# Patient Record
Sex: FEMALE | Race: ASIAN | NOT HISPANIC OR LATINO | URBAN - METROPOLITAN AREA
[De-identification: names, ages, dates, MRNs, and addresses within clinical notes are randomized per-mention and may not be internally consistent; named-entity substitution may affect disease eponyms.]

---

## 2017-01-01 ENCOUNTER — EMERGENCY (EMERGENCY)
Facility: HOSPITAL | Age: 82
LOS: 1 days | End: 2017-01-01
Attending: EMERGENCY MEDICINE | Admitting: EMERGENCY MEDICINE
Payer: MEDICARE

## 2017-01-01 ENCOUNTER — INPATIENT (INPATIENT)
Facility: HOSPITAL | Age: 82
LOS: 2 days | Discharge: NURSING, REHAB OR LONG-TERM CARE | DRG: 641 | End: 2017-09-22
Attending: STUDENT IN AN ORGANIZED HEALTH CARE EDUCATION/TRAINING PROGRAM | Admitting: STUDENT IN AN ORGANIZED HEALTH CARE EDUCATION/TRAINING PROGRAM
Payer: MEDICARE

## 2017-01-01 VITALS
OXYGEN SATURATION: 98 % | SYSTOLIC BLOOD PRESSURE: 96 MMHG | DIASTOLIC BLOOD PRESSURE: 54 MMHG | HEART RATE: 72 BPM | RESPIRATION RATE: 16 BRPM | TEMPERATURE: 98 F

## 2017-01-01 VITALS
DIASTOLIC BLOOD PRESSURE: 50 MMHG | HEART RATE: 59 BPM | RESPIRATION RATE: 16 BRPM | SYSTOLIC BLOOD PRESSURE: 121 MMHG | TEMPERATURE: 98 F | OXYGEN SATURATION: 97 %

## 2017-01-01 VITALS — TEMPERATURE: 32 F

## 2017-01-01 DIAGNOSIS — E11.9 TYPE 2 DIABETES MELLITUS WITHOUT COMPLICATIONS: ICD-10-CM

## 2017-01-01 DIAGNOSIS — F03.90 UNSPECIFIED DEMENTIA, UNSPECIFIED SEVERITY, WITHOUT BEHAVIORAL DISTURBANCE, PSYCHOTIC DISTURBANCE, MOOD DISTURBANCE, AND ANXIETY: ICD-10-CM

## 2017-01-01 DIAGNOSIS — R63.8 OTHER SYMPTOMS AND SIGNS CONCERNING FOOD AND FLUID INTAKE: ICD-10-CM

## 2017-01-01 DIAGNOSIS — Z91.011 ALLERGY TO MILK PRODUCTS: ICD-10-CM

## 2017-01-01 DIAGNOSIS — J90 PLEURAL EFFUSION, NOT ELSEWHERE CLASSIFIED: ICD-10-CM

## 2017-01-01 DIAGNOSIS — E86.0 DEHYDRATION: ICD-10-CM

## 2017-01-01 DIAGNOSIS — R29.6 REPEATED FALLS: ICD-10-CM
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
## 2017-01-01 DIAGNOSIS — I10 ESSENTIAL (PRIMARY) HYPERTENSION: ICD-10-CM

## 2017-01-01 DIAGNOSIS — Z91.013 ALLERGY TO SEAFOOD: ICD-10-CM

## 2017-01-01 DIAGNOSIS — Z29.9 ENCOUNTER FOR PROPHYLACTIC MEASURES, UNSPECIFIED: ICD-10-CM

## 2017-01-01 DIAGNOSIS — Z88.0 ALLERGY STATUS TO PENICILLIN: ICD-10-CM

## 2017-01-01 DIAGNOSIS — Z79.84 LONG TERM (CURRENT) USE OF ORAL HYPOGLYCEMIC DRUGS: ICD-10-CM

## 2017-01-01 DIAGNOSIS — R07.9 CHEST PAIN, UNSPECIFIED: ICD-10-CM

## 2017-01-01 DIAGNOSIS — Z90.5 ACQUIRED ABSENCE OF KIDNEY: Chronic | ICD-10-CM

## 2017-01-01 DIAGNOSIS — I48.2 CHRONIC ATRIAL FIBRILLATION: ICD-10-CM

## 2017-01-01 DIAGNOSIS — E87.1 HYPO-OSMOLALITY AND HYPONATREMIA: ICD-10-CM

## 2017-01-01 DIAGNOSIS — M25.511 PAIN IN RIGHT SHOULDER: ICD-10-CM

## 2017-01-01 DIAGNOSIS — Z79.899 OTHER LONG TERM (CURRENT) DRUG THERAPY: ICD-10-CM

## 2017-01-01 DIAGNOSIS — R15.9 FULL INCONTINENCE OF FECES: ICD-10-CM

## 2017-01-01 DIAGNOSIS — Z90.710 ACQUIRED ABSENCE OF BOTH CERVIX AND UTERUS: Chronic | ICD-10-CM
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
## 2017-01-01 DIAGNOSIS — Z79.01 LONG TERM (CURRENT) USE OF ANTICOAGULANTS: ICD-10-CM

## 2017-01-01 DIAGNOSIS — R07.89 OTHER CHEST PAIN: ICD-10-CM

## 2017-01-01 DIAGNOSIS — T79.6XXA TRAUMATIC ISCHEMIA OF MUSCLE, INITIAL ENCOUNTER: ICD-10-CM

## 2017-01-01 DIAGNOSIS — I46.9 CARDIAC ARREST, CAUSE UNSPECIFIED: ICD-10-CM

## 2017-01-01 LAB
ANION GAP SERPL CALC-SCNC: 10 MMOL/L — SIGNIFICANT CHANGE UP (ref 9–16)
ANION GAP SERPL CALC-SCNC: 11 MMOL/L — SIGNIFICANT CHANGE UP (ref 5–17)
ANION GAP SERPL CALC-SCNC: 12 MMOL/L — SIGNIFICANT CHANGE UP (ref 9–16)
ANION GAP SERPL CALC-SCNC: 13 MMOL/L — SIGNIFICANT CHANGE UP (ref 5–17)
ANION GAP SERPL CALC-SCNC: 13 MMOL/L — SIGNIFICANT CHANGE UP (ref 5–17)
APPEARANCE UR: CLEAR — SIGNIFICANT CHANGE UP
APTT BLD: 51.9 SEC — HIGH (ref 27.5–36.5)
BASOPHILS NFR BLD AUTO: 0.3 % — SIGNIFICANT CHANGE UP (ref 0–2)
BILIRUB UR-MCNC: NEGATIVE — SIGNIFICANT CHANGE UP
BUN SERPL-MCNC: 10 MG/DL — SIGNIFICANT CHANGE UP (ref 7–23)
BUN SERPL-MCNC: 10 MG/DL — SIGNIFICANT CHANGE UP (ref 7–23)
BUN SERPL-MCNC: 12 MG/DL — SIGNIFICANT CHANGE UP (ref 7–23)
BUN SERPL-MCNC: 14 MG/DL — SIGNIFICANT CHANGE UP (ref 7–23)
BUN SERPL-MCNC: 14 MG/DL — SIGNIFICANT CHANGE UP (ref 7–23)
CALCIUM SERPL-MCNC: 8.2 MG/DL — LOW (ref 8.5–10.5)
CALCIUM SERPL-MCNC: 8.4 MG/DL — SIGNIFICANT CHANGE UP (ref 8.4–10.5)
CALCIUM SERPL-MCNC: 8.7 MG/DL — SIGNIFICANT CHANGE UP (ref 8.5–10.5)
CALCIUM SERPL-MCNC: 8.9 MG/DL — SIGNIFICANT CHANGE UP (ref 8.4–10.5)
CALCIUM SERPL-MCNC: 9.1 MG/DL — SIGNIFICANT CHANGE UP (ref 8.4–10.5)
CHLORIDE SERPL-SCNC: 89 MMOL/L — LOW (ref 96–108)
CHLORIDE SERPL-SCNC: 89 MMOL/L — LOW (ref 96–108)
CHLORIDE SERPL-SCNC: 91 MMOL/L — LOW (ref 96–108)
CHLORIDE SERPL-SCNC: 91 MMOL/L — LOW (ref 96–108)
CHLORIDE SERPL-SCNC: 96 MMOL/L — SIGNIFICANT CHANGE UP (ref 96–108)
CK SERPL-CCNC: 541 U/L — HIGH (ref 26–192)
CO2 SERPL-SCNC: 20 MMOL/L — LOW (ref 22–31)
CO2 SERPL-SCNC: 23 MMOL/L — SIGNIFICANT CHANGE UP (ref 22–31)
CO2 SERPL-SCNC: 24 MMOL/L — SIGNIFICANT CHANGE UP (ref 22–31)
COLOR SPEC: YELLOW — SIGNIFICANT CHANGE UP
CREAT SERPL-MCNC: 0.52 MG/DL — SIGNIFICANT CHANGE UP (ref 0.5–1.3)
CREAT SERPL-MCNC: 0.54 MG/DL — SIGNIFICANT CHANGE UP (ref 0.5–1.3)
CREAT SERPL-MCNC: 0.59 MG/DL — SIGNIFICANT CHANGE UP (ref 0.5–1.3)
CREAT SERPL-MCNC: 0.76 MG/DL — SIGNIFICANT CHANGE UP (ref 0.5–1.3)
CREAT SERPL-MCNC: 0.76 MG/DL — SIGNIFICANT CHANGE UP (ref 0.5–1.3)
CULTURE RESULTS: SIGNIFICANT CHANGE UP
DIFF PNL FLD: (no result)
EOSINOPHIL NFR BLD AUTO: 0.2 % — SIGNIFICANT CHANGE UP (ref 0–6)
FOLATE SERPL-MCNC: >20 NG/ML — SIGNIFICANT CHANGE UP (ref 4.8–24.2)
GLUCOSE SERPL-MCNC: 120 MG/DL — HIGH (ref 70–99)
GLUCOSE SERPL-MCNC: 149 MG/DL — HIGH (ref 70–99)
GLUCOSE SERPL-MCNC: 149 MG/DL — HIGH (ref 70–99)
GLUCOSE SERPL-MCNC: 173 MG/DL — HIGH (ref 70–99)
GLUCOSE SERPL-MCNC: 68 MG/DL — LOW (ref 70–99)
GLUCOSE UR QL: 100
HBA1C BLD-MCNC: 6.8 % — HIGH (ref 4–5.6)
HCT VFR BLD CALC: 34.7 % — SIGNIFICANT CHANGE UP (ref 34.5–45)
HCT VFR BLD CALC: 37.1 % — SIGNIFICANT CHANGE UP (ref 34.5–45)
HCT VFR BLD CALC: 38.3 % — SIGNIFICANT CHANGE UP (ref 34.5–45)
HGB BLD-MCNC: 12.3 G/DL — SIGNIFICANT CHANGE UP (ref 11.5–15.5)
HGB BLD-MCNC: 13.3 G/DL — SIGNIFICANT CHANGE UP (ref 11.5–15.5)
HGB BLD-MCNC: 13.3 G/DL — SIGNIFICANT CHANGE UP (ref 11.5–15.5)
IMM GRANULOCYTES NFR BLD AUTO: 0.8 % — SIGNIFICANT CHANGE UP (ref 0–1.5)
INR BLD: 1.21 — HIGH (ref 0.88–1.16)
KETONES UR-MCNC: 15 MG/DL
LEUKOCYTE ESTERASE UR-ACNC: NEGATIVE — SIGNIFICANT CHANGE UP
LYMPHOCYTES # BLD AUTO: 4 % — LOW (ref 13–44)
MAGNESIUM SERPL-MCNC: 1.6 MG/DL — SIGNIFICANT CHANGE UP (ref 1.6–2.6)
MAGNESIUM SERPL-MCNC: 1.7 MG/DL — SIGNIFICANT CHANGE UP (ref 1.6–2.6)
MCHC RBC-ENTMCNC: 31.3 PG — SIGNIFICANT CHANGE UP (ref 27–34)
MCHC RBC-ENTMCNC: 31.4 PG — SIGNIFICANT CHANGE UP (ref 27–34)
MCHC RBC-ENTMCNC: 31.5 PG — SIGNIFICANT CHANGE UP (ref 27–34)
MCHC RBC-ENTMCNC: 34.7 G/DL — SIGNIFICANT CHANGE UP (ref 32–36)
MCHC RBC-ENTMCNC: 35.4 G/DL — SIGNIFICANT CHANGE UP (ref 32–36)
MCHC RBC-ENTMCNC: 35.8 G/DL — SIGNIFICANT CHANGE UP (ref 32–36)
MCV RBC AUTO: 87.9 FL — SIGNIFICANT CHANGE UP (ref 80–100)
MCV RBC AUTO: 88.5 FL — SIGNIFICANT CHANGE UP (ref 80–100)
MCV RBC AUTO: 90.1 FL — SIGNIFICANT CHANGE UP (ref 80–100)
MONOCYTES NFR BLD AUTO: 4.9 % — SIGNIFICANT CHANGE UP (ref 2–14)
NEUTROPHILS NFR BLD AUTO: 89.8 % — HIGH (ref 43–77)
NITRITE UR-MCNC: NEGATIVE — SIGNIFICANT CHANGE UP
OSMOLALITY SERPL: 256 MOSM/KG — LOW (ref 280–301)
OSMOLALITY UR: 299 MOSMOL/KG — SIGNIFICANT CHANGE UP (ref 100–650)
PH UR: 6.5 — SIGNIFICANT CHANGE UP (ref 5–8)
PHOSPHATE SERPL-MCNC: 2.6 MG/DL — SIGNIFICANT CHANGE UP (ref 2.5–4.5)
PLATELET # BLD AUTO: 172 K/UL — SIGNIFICANT CHANGE UP (ref 150–400)
PLATELET # BLD AUTO: 181 K/UL — SIGNIFICANT CHANGE UP (ref 150–400)
PLATELET # BLD AUTO: 197 K/UL — SIGNIFICANT CHANGE UP (ref 150–400)
POTASSIUM SERPL-MCNC: 3.4 MMOL/L — LOW (ref 3.5–5.3)
POTASSIUM SERPL-MCNC: 3.8 MMOL/L — SIGNIFICANT CHANGE UP (ref 3.5–5.3)
POTASSIUM SERPL-MCNC: 4.1 MMOL/L — SIGNIFICANT CHANGE UP (ref 3.5–5.3)
POTASSIUM SERPL-SCNC: 3.4 MMOL/L — LOW (ref 3.5–5.3)
POTASSIUM SERPL-SCNC: 3.8 MMOL/L — SIGNIFICANT CHANGE UP (ref 3.5–5.3)
POTASSIUM SERPL-SCNC: 4.1 MMOL/L — SIGNIFICANT CHANGE UP (ref 3.5–5.3)
PROT UR-MCNC: 100 MG/DL
PROTHROM AB SERPL-ACNC: 13.4 SEC — HIGH (ref 9.8–12.7)
RBC # BLD: 3.92 M/UL — SIGNIFICANT CHANGE UP (ref 3.8–5.2)
RBC # BLD: 4.22 M/UL — SIGNIFICANT CHANGE UP (ref 3.8–5.2)
RBC # BLD: 4.25 M/UL — SIGNIFICANT CHANGE UP (ref 3.8–5.2)
RBC # FLD: 13.2 % — SIGNIFICANT CHANGE UP (ref 10.3–16.9)
RBC # FLD: 14 % — SIGNIFICANT CHANGE UP (ref 10.3–16.9)
RBC # FLD: 14.3 % — SIGNIFICANT CHANGE UP (ref 10.3–16.9)
SODIUM SERPL-SCNC: 121 MMOL/L — LOW (ref 132–145)
SODIUM SERPL-SCNC: 124 MMOL/L — LOW (ref 132–145)
SODIUM SERPL-SCNC: 125 MMOL/L — LOW (ref 135–145)
SODIUM SERPL-SCNC: 126 MMOL/L — LOW (ref 135–145)
SODIUM SERPL-SCNC: 132 MMOL/L — LOW (ref 135–145)
SODIUM UR-SCNC: 45 MMOL/L — SIGNIFICANT CHANGE UP
SP GR SPEC: 1.02 — SIGNIFICANT CHANGE UP (ref 1–1.03)
SPECIMEN SOURCE: SIGNIFICANT CHANGE UP
TROPONIN I SERPL-MCNC: 0.04 NG/ML — SIGNIFICANT CHANGE UP (ref 0.02–0.06)
TSH SERPL-MCNC: 0.94 UIU/ML — SIGNIFICANT CHANGE UP (ref 0.35–4.94)
UROBILINOGEN FLD QL: 0.2 E.U./DL — SIGNIFICANT CHANGE UP
VIT B12 SERPL-MCNC: 1670 PG/ML — HIGH (ref 243–894)
WBC # BLD: 10.1 K/UL — SIGNIFICANT CHANGE UP (ref 3.8–10.5)
WBC # BLD: 7.3 K/UL — SIGNIFICANT CHANGE UP (ref 3.8–10.5)
WBC # BLD: 7.4 K/UL — SIGNIFICANT CHANGE UP (ref 3.8–10.5)
WBC # FLD AUTO: 10.1 K/UL — SIGNIFICANT CHANGE UP (ref 3.8–10.5)
WBC # FLD AUTO: 7.3 K/UL — SIGNIFICANT CHANGE UP (ref 3.8–10.5)
WBC # FLD AUTO: 7.4 K/UL — SIGNIFICANT CHANGE UP (ref 3.8–10.5)

## 2017-01-01 PROCEDURE — 99285 EMERGENCY DEPT VISIT HI MDM: CPT | Mod: 25

## 2017-01-01 PROCEDURE — 96374 THER/PROPH/DIAG INJ IV PUSH: CPT | Mod: XU

## 2017-01-01 PROCEDURE — 71100 X-RAY EXAM RIBS UNI 2 VIEWS: CPT

## 2017-01-01 PROCEDURE — 71045 X-RAY EXAM CHEST 1 VIEW: CPT

## 2017-01-01 PROCEDURE — 99238 HOSP IP/OBS DSCHRG MGMT 30/<: CPT

## 2017-01-01 PROCEDURE — 93010 ELECTROCARDIOGRAM REPORT: CPT

## 2017-01-01 PROCEDURE — 84100 ASSAY OF PHOSPHORUS: CPT

## 2017-01-01 PROCEDURE — 83735 ASSAY OF MAGNESIUM: CPT

## 2017-01-01 PROCEDURE — 71010: CPT | Mod: 26

## 2017-01-01 PROCEDURE — 97161 PT EVAL LOW COMPLEX 20 MIN: CPT

## 2017-01-01 PROCEDURE — 92950 HEART/LUNG RESUSCITATION CPR: CPT

## 2017-01-01 PROCEDURE — 70450 CT HEAD/BRAIN W/O DYE: CPT

## 2017-01-01 PROCEDURE — 72170 X-RAY EXAM OF PELVIS: CPT | Mod: 26

## 2017-01-01 PROCEDURE — 99223 1ST HOSP IP/OBS HIGH 75: CPT | Mod: GC

## 2017-01-01 PROCEDURE — 83935 ASSAY OF URINE OSMOLALITY: CPT

## 2017-01-01 PROCEDURE — 99291 CRITICAL CARE FIRST HOUR: CPT | Mod: 25

## 2017-01-01 PROCEDURE — 82746 ASSAY OF FOLIC ACID SERUM: CPT

## 2017-01-01 PROCEDURE — 36415 COLL VENOUS BLD VENIPUNCTURE: CPT

## 2017-01-01 PROCEDURE — 72170 X-RAY EXAM OF PELVIS: CPT

## 2017-01-01 PROCEDURE — 84443 ASSAY THYROID STIM HORMONE: CPT

## 2017-01-01 PROCEDURE — 95819 EEG AWAKE AND ASLEEP: CPT | Mod: 26

## 2017-01-01 PROCEDURE — 85610 PROTHROMBIN TIME: CPT

## 2017-01-01 PROCEDURE — 82550 ASSAY OF CK (CPK): CPT

## 2017-01-01 PROCEDURE — 85730 THROMBOPLASTIN TIME PARTIAL: CPT

## 2017-01-01 PROCEDURE — 82607 VITAMIN B-12: CPT

## 2017-01-01 PROCEDURE — 99233 SBSQ HOSP IP/OBS HIGH 50: CPT

## 2017-01-01 PROCEDURE — 96375 TX/PRO/DX INJ NEW DRUG ADDON: CPT | Mod: XU

## 2017-01-01 PROCEDURE — 72125 CT NECK SPINE W/O DYE: CPT

## 2017-01-01 PROCEDURE — 85025 COMPLETE CBC W/AUTO DIFF WBC: CPT

## 2017-01-01 PROCEDURE — 83930 ASSAY OF BLOOD OSMOLALITY: CPT

## 2017-01-01 PROCEDURE — 87086 URINE CULTURE/COLONY COUNT: CPT

## 2017-01-01 PROCEDURE — 71101 X-RAY EXAM UNILAT RIBS/CHEST: CPT | Mod: 26

## 2017-01-01 PROCEDURE — 73030 X-RAY EXAM OF SHOULDER: CPT

## 2017-01-01 PROCEDURE — 81001 URINALYSIS AUTO W/SCOPE: CPT

## 2017-01-01 PROCEDURE — 70450 CT HEAD/BRAIN W/O DYE: CPT | Mod: 26

## 2017-01-01 PROCEDURE — 93005 ELECTROCARDIOGRAM TRACING: CPT

## 2017-01-01 PROCEDURE — 95819 EEG AWAKE AND ASLEEP: CPT

## 2017-01-01 PROCEDURE — 73030 X-RAY EXAM OF SHOULDER: CPT | Mod: 26,RT

## 2017-01-01 PROCEDURE — 80048 BASIC METABOLIC PNL TOTAL CA: CPT

## 2017-01-01 PROCEDURE — 84484 ASSAY OF TROPONIN QUANT: CPT

## 2017-01-01 PROCEDURE — 85027 COMPLETE CBC AUTOMATED: CPT

## 2017-01-01 PROCEDURE — 84300 ASSAY OF URINE SODIUM: CPT

## 2017-01-01 PROCEDURE — 72125 CT NECK SPINE W/O DYE: CPT | Mod: 26

## 2017-01-01 PROCEDURE — 97116 GAIT TRAINING THERAPY: CPT

## 2017-01-01 PROCEDURE — 83036 HEMOGLOBIN GLYCOSYLATED A1C: CPT

## 2017-01-01 RX ORDER — GLUCAGON INJECTION, SOLUTION 0.5 MG/.1ML
1 INJECTION, SOLUTION SUBCUTANEOUS ONCE
Qty: 0 | Refills: 0 | Status: DISCONTINUED | OUTPATIENT
Start: 2017-01-01 | End: 2017-01-01

## 2017-01-01 RX ORDER — EPINEPHRINE 0.3 MG/.3ML
1 INJECTION INTRAMUSCULAR; SUBCUTANEOUS ONCE
Qty: 0 | Refills: 0 | Status: COMPLETED | OUTPATIENT
Start: 2017-01-01 | End: 2017-01-01

## 2017-01-01 RX ORDER — ACETAMINOPHEN 500 MG
650 TABLET ORAL EVERY 6 HOURS
Qty: 0 | Refills: 0 | Status: DISCONTINUED | OUTPATIENT
Start: 2017-01-01 | End: 2017-01-01

## 2017-01-01 RX ORDER — HEPARIN SODIUM 5000 [USP'U]/ML
5000 INJECTION INTRAVENOUS; SUBCUTANEOUS EVERY 8 HOURS
Qty: 0 | Refills: 0 | Status: DISCONTINUED | OUTPATIENT
Start: 2017-01-01 | End: 2017-01-01

## 2017-01-01 RX ORDER — SODIUM CHLORIDE 9 MG/ML
1000 INJECTION INTRAMUSCULAR; INTRAVENOUS; SUBCUTANEOUS
Qty: 0 | Refills: 0 | Status: DISCONTINUED | OUTPATIENT
Start: 2017-01-01 | End: 2017-01-01

## 2017-01-01 RX ORDER — INSULIN LISPRO 100/ML
VIAL (ML) SUBCUTANEOUS
Qty: 0 | Refills: 0 | Status: DISCONTINUED | OUTPATIENT
Start: 2017-01-01 | End: 2017-01-01

## 2017-01-01 RX ORDER — MEMANTINE HYDROCHLORIDE 10 MG/1
20 TABLET ORAL DAILY
Qty: 0 | Refills: 0 | Status: DISCONTINUED | OUTPATIENT
Start: 2017-01-01 | End: 2017-01-01

## 2017-01-01 RX ORDER — SITAGLIPTIN 50 MG/1
1 TABLET, FILM COATED ORAL
Qty: 0 | Refills: 0 | COMMUNITY

## 2017-01-01 RX ORDER — DEXTROSE 50 % IN WATER 50 %
25 SYRINGE (ML) INTRAVENOUS ONCE
Qty: 0 | Refills: 0 | Status: DISCONTINUED | OUTPATIENT
Start: 2017-01-01 | End: 2017-01-01

## 2017-01-01 RX ORDER — SODIUM BICARBONATE 1 MEQ/ML
50 SYRINGE (ML) INTRAVENOUS ONCE
Qty: 0 | Refills: 0 | Status: COMPLETED | OUTPATIENT
Start: 2017-01-01 | End: 2017-01-01

## 2017-01-01 RX ORDER — LISINOPRIL 2.5 MG/1
1 TABLET ORAL
Qty: 0 | Refills: 0 | COMMUNITY

## 2017-01-01 RX ORDER — PROPAFENONE HCL 150 MG
1 TABLET ORAL
Qty: 0 | Refills: 0 | COMMUNITY

## 2017-01-01 RX ORDER — PROPAFENONE HCL 150 MG
225 TABLET ORAL
Qty: 0 | Refills: 0 | Status: DISCONTINUED | OUTPATIENT
Start: 2017-01-01 | End: 2017-01-01

## 2017-01-01 RX ORDER — SODIUM CHLORIDE 9 MG/ML
500 INJECTION INTRAMUSCULAR; INTRAVENOUS; SUBCUTANEOUS ONCE
Qty: 0 | Refills: 0 | Status: COMPLETED | OUTPATIENT
Start: 2017-01-01 | End: 2017-01-01

## 2017-01-01 RX ORDER — CALCIUM CHLORIDE
100 POWDER (GRAM) MISCELLANEOUS ONCE
Qty: 0 | Refills: 0 | Status: COMPLETED | OUTPATIENT
Start: 2017-01-01 | End: 2017-01-01

## 2017-01-01 RX ORDER — MAGNESIUM SULFATE 500 MG/ML
2 VIAL (ML) INJECTION ONCE
Qty: 0 | Refills: 0 | Status: COMPLETED | OUTPATIENT
Start: 2017-01-01 | End: 2017-01-01

## 2017-01-01 RX ORDER — DEXTROSE 50 % IN WATER 50 %
12.5 SYRINGE (ML) INTRAVENOUS ONCE
Qty: 0 | Refills: 0 | Status: DISCONTINUED | OUTPATIENT
Start: 2017-01-01 | End: 2017-01-01

## 2017-01-01 RX ORDER — POTASSIUM CHLORIDE 20 MEQ
40 PACKET (EA) ORAL ONCE
Qty: 0 | Refills: 0 | Status: COMPLETED | OUTPATIENT
Start: 2017-01-01 | End: 2017-01-01

## 2017-01-01 RX ORDER — DEXTROSE 50 % IN WATER 50 %
1 SYRINGE (ML) INTRAVENOUS ONCE
Qty: 0 | Refills: 0 | Status: DISCONTINUED | OUTPATIENT
Start: 2017-01-01 | End: 2017-01-01

## 2017-01-01 RX ORDER — SOLIFENACIN SUCCINATE 10 MG/1
1 TABLET ORAL
Qty: 0 | Refills: 0 | COMMUNITY

## 2017-01-01 RX ORDER — SODIUM CHLORIDE 9 MG/ML
1000 INJECTION, SOLUTION INTRAVENOUS
Qty: 0 | Refills: 0 | Status: DISCONTINUED | OUTPATIENT
Start: 2017-01-01 | End: 2017-01-01

## 2017-01-01 RX ORDER — METFORMIN HYDROCHLORIDE 850 MG/1
1 TABLET ORAL
Qty: 0 | Refills: 0 | COMMUNITY

## 2017-01-01 RX ORDER — MEMANTINE HYDROCHLORIDE 10 MG/1
21 TABLET ORAL
Qty: 0 | Refills: 0 | COMMUNITY

## 2017-01-01 RX ORDER — OXYBUTYNIN CHLORIDE 5 MG
5 TABLET ORAL DAILY
Qty: 0 | Refills: 0 | Status: DISCONTINUED | OUTPATIENT
Start: 2017-01-01 | End: 2017-01-01

## 2017-01-01 RX ORDER — TRAMADOL HYDROCHLORIDE 50 MG/1
25 TABLET ORAL ONCE
Qty: 0 | Refills: 0 | Status: DISCONTINUED | OUTPATIENT
Start: 2017-01-01 | End: 2017-01-01

## 2017-01-01 RX ORDER — PROPAFENONE HCL 150 MG
225 TABLET ORAL ONCE
Qty: 0 | Refills: 0 | Status: COMPLETED | OUTPATIENT
Start: 2017-01-01 | End: 2017-01-01

## 2017-01-01 RX ORDER — LISINOPRIL 2.5 MG/1
5 TABLET ORAL DAILY
Qty: 0 | Refills: 0 | Status: DISCONTINUED | OUTPATIENT
Start: 2017-01-01 | End: 2017-01-01

## 2017-01-01 RX ORDER — SIMVASTATIN 20 MG/1
1 TABLET, FILM COATED ORAL
Qty: 0 | Refills: 0 | COMMUNITY

## 2017-01-01 RX ORDER — DABIGATRAN ETEXILATE MESYLATE 150 MG/1
75 CAPSULE ORAL EVERY 12 HOURS
Qty: 0 | Refills: 0 | Status: DISCONTINUED | OUTPATIENT
Start: 2017-01-01 | End: 2017-01-01

## 2017-01-01 RX ORDER — DABIGATRAN ETEXILATE MESYLATE 150 MG/1
1 CAPSULE ORAL
Qty: 0 | Refills: 0 | COMMUNITY

## 2017-01-01 RX ORDER — SIMVASTATIN 20 MG/1
10 TABLET, FILM COATED ORAL AT BEDTIME
Qty: 0 | Refills: 0 | Status: DISCONTINUED | OUTPATIENT
Start: 2017-01-01 | End: 2017-01-01

## 2017-01-01 RX ADMIN — LISINOPRIL 5 MILLIGRAM(S): 2.5 TABLET ORAL at 06:43

## 2017-01-01 RX ADMIN — SODIUM CHLORIDE 1500 MILLILITER(S): 9 INJECTION INTRAMUSCULAR; INTRAVENOUS; SUBCUTANEOUS at 21:49

## 2017-01-01 RX ADMIN — Medication 50 MILLIEQUIVALENT(S): at 04:24

## 2017-01-01 RX ADMIN — LISINOPRIL 5 MILLIGRAM(S): 2.5 TABLET ORAL at 05:31

## 2017-01-01 RX ADMIN — Medication 5 MILLIGRAM(S): at 12:15

## 2017-01-01 RX ADMIN — Medication 4: at 12:41

## 2017-01-01 RX ADMIN — MEMANTINE HYDROCHLORIDE 20 MILLIGRAM(S): 10 TABLET ORAL at 11:42

## 2017-01-01 RX ADMIN — Medication 650 MILLIGRAM(S): at 21:01

## 2017-01-01 RX ADMIN — DABIGATRAN ETEXILATE MESYLATE 75 MILLIGRAM(S): 150 CAPSULE ORAL at 10:35

## 2017-01-01 RX ADMIN — Medication 5 MILLIGRAM(S): at 10:18

## 2017-01-01 RX ADMIN — Medication 2: at 10:02

## 2017-01-01 RX ADMIN — HEPARIN SODIUM 5000 UNIT(S): 5000 INJECTION INTRAVENOUS; SUBCUTANEOUS at 06:42

## 2017-01-01 RX ADMIN — Medication 650 MILLIGRAM(S): at 16:29

## 2017-01-01 RX ADMIN — MEMANTINE HYDROCHLORIDE 20 MILLIGRAM(S): 10 TABLET ORAL at 10:18

## 2017-01-01 RX ADMIN — Medication 2: at 18:17

## 2017-01-01 RX ADMIN — Medication 650 MILLIGRAM(S): at 18:16

## 2017-01-01 RX ADMIN — HEPARIN SODIUM 5000 UNIT(S): 5000 INJECTION INTRAVENOUS; SUBCUTANEOUS at 05:13

## 2017-01-01 RX ADMIN — Medication 50 MILLIEQUIVALENT(S): at 04:29

## 2017-01-01 RX ADMIN — EPINEPHRINE 1 MILLIGRAM(S): 0.3 INJECTION INTRAMUSCULAR; SUBCUTANEOUS at 04:23

## 2017-01-01 RX ADMIN — Medication 225 MILLIGRAM(S): at 05:14

## 2017-01-01 RX ADMIN — Medication 100 MILLIGRAM(S): at 04:25

## 2017-01-01 RX ADMIN — LISINOPRIL 5 MILLIGRAM(S): 2.5 TABLET ORAL at 05:13

## 2017-01-01 RX ADMIN — DABIGATRAN ETEXILATE MESYLATE 75 MILLIGRAM(S): 150 CAPSULE ORAL at 22:38

## 2017-01-01 RX ADMIN — Medication 225 MILLIGRAM(S): at 07:20

## 2017-01-01 RX ADMIN — DABIGATRAN ETEXILATE MESYLATE 75 MILLIGRAM(S): 150 CAPSULE ORAL at 10:02

## 2017-01-01 RX ADMIN — Medication 100 MILLIGRAM(S): at 04:30

## 2017-01-01 RX ADMIN — Medication 225 MILLIGRAM(S): at 18:17

## 2017-01-01 RX ADMIN — Medication 650 MILLIGRAM(S): at 05:13

## 2017-01-01 RX ADMIN — EPINEPHRINE 1 MILLIGRAM(S): 0.3 INJECTION INTRAMUSCULAR; SUBCUTANEOUS at 04:31

## 2017-01-01 RX ADMIN — SIMVASTATIN 10 MILLIGRAM(S): 20 TABLET, FILM COATED ORAL at 22:32

## 2017-01-01 RX ADMIN — Medication 4: at 12:43

## 2017-01-01 RX ADMIN — Medication 40 MILLIEQUIVALENT(S): at 06:43

## 2017-01-01 RX ADMIN — HEPARIN SODIUM 5000 UNIT(S): 5000 INJECTION INTRAVENOUS; SUBCUTANEOUS at 21:37

## 2017-01-01 RX ADMIN — Medication 5 MILLIGRAM(S): at 11:42

## 2017-01-01 RX ADMIN — MEMANTINE HYDROCHLORIDE 20 MILLIGRAM(S): 10 TABLET ORAL at 12:15

## 2017-01-01 RX ADMIN — SODIUM CHLORIDE 1500 MILLILITER(S): 9 INJECTION INTRAMUSCULAR; INTRAVENOUS; SUBCUTANEOUS at 10:11

## 2017-01-01 RX ADMIN — Medication 225 MILLIGRAM(S): at 19:44

## 2017-01-01 RX ADMIN — SODIUM CHLORIDE 125 MILLILITER(S): 9 INJECTION INTRAMUSCULAR; INTRAVENOUS; SUBCUTANEOUS at 23:36

## 2017-01-01 RX ADMIN — EPINEPHRINE 1 MILLIGRAM(S): 0.3 INJECTION INTRAMUSCULAR; SUBCUTANEOUS at 04:27

## 2017-01-01 RX ADMIN — Medication 650 MILLIGRAM(S): at 17:15

## 2017-01-01 RX ADMIN — Medication 50 GRAM(S): at 10:16

## 2017-01-01 RX ADMIN — Medication 225 MILLIGRAM(S): at 23:36

## 2017-01-01 RX ADMIN — TRAMADOL HYDROCHLORIDE 25 MILLIGRAM(S): 50 TABLET ORAL at 22:15

## 2017-01-01 RX ADMIN — TRAMADOL HYDROCHLORIDE 25 MILLIGRAM(S): 50 TABLET ORAL at 21:01

## 2017-01-01 RX ADMIN — SIMVASTATIN 10 MILLIGRAM(S): 20 TABLET, FILM COATED ORAL at 21:38

## 2017-01-01 RX ADMIN — Medication 4: at 18:17

## 2017-01-01 RX ADMIN — Medication 50 GRAM(S): at 06:43

## 2017-01-01 RX ADMIN — Medication 225 MILLIGRAM(S): at 05:30

## 2017-09-19 NOTE — ED PROVIDER NOTE - MUSCULOSKELETAL, MLM
TTP along anterior R sided ribs. No crepitus. No ecchymosis. No deformities. Ecchymosis to R anterior shoulder. Old rotator cuff injury noted by daughter during exam. Upper extremities able to range active and passive. Vascularly intact with good equal distal pulses. Lower extremities no deformity, no shortening. Full ROM active and passive. No tenderness appreciated around pelvis. Pelvis stable. No pinpoint back tenderness.

## 2017-09-19 NOTE — ED PROVIDER NOTE - MEDICAL DECISION MAKING DETAILS
CT brain and C-spine, CXR, X-ray pelvis and x-ray rib series. Tramadol and Tylenol for pain. Full labs including CPK to r/o rhabdo. Troponin, r/o ACS. EKG done. R/o any other cause of fall.

## 2017-09-19 NOTE — ED ADULT TRIAGE NOTE - CHIEF COMPLAINT QUOTE
Pt brought in by daughter for unwitnessed fall. Daughter last spoke with pt last night, was found on the floor of her apartment this am. Pt has hx of dementia and is unable to elaborate on fall

## 2017-09-19 NOTE — ED ADULT NURSE NOTE - OBJECTIVE STATEMENT
pt biba with daughter s/p unwitnessed fall possibly today? daughter last spoke with pt last night. pt found on fall this morning. pt has dementia and is unable to explain what happened and does not have recollection of fall. hx htn dm afib. pt is on pradaxa. md aware, waiting for ct scan. pt placed on cardiac montor.

## 2017-09-19 NOTE — ED PROVIDER NOTE - OBJECTIVE STATEMENT
89 Chinese speaking F, hx afib, on prodaxa, advanced dementia, HTN, IDDM, lives alone, has HHA from 2-6 every day. brought in [place] being found on the floor of a bathroom which was wet, lying on her back. daughter is unsure how long she as o nthe floor. last spok at 9pm last night and was getting up to have ln meal. pt daughter notes she ambulates w walker or cane. has imbalance issues and hx of falls. notes that mother is now at baseline a&o x1. follows simple commands. took mother to her pmd at [place] [facility] and referreed to ER for imaging and evaluation. Pt's son noted he called mo mat 11 and she ddi not pic up fone, daughter went to apt at 230 foudn in urine and feces, hx full incontine wears diaper, found slippers on wet fl. awake ust not able to get up, daughter helped into taxi curr c/o R shoulder pain R rib pain and R thigh pain. Poor historian 2/2 dementia. 89 Chinese speaking F, hx A-fib, on Prodaxa, h/o advanced dementia, HTN, IDDM. Lives alone, has HHA from 2-6PM every day. Pt brought in by daughter after being found on the wet floor of a bathroom lying on her back. Daughter is unsure how long pt was on the floor. Last spoke to pt at 9pm last night and she was getting up at that time to have a late meal. Pt's daughter notes she ambulates with a walker or cane as she has imbalance issues and h/o falls. Notes that mother is now at baseline which is A&O x1. Pt follows simple commands. Took mother to her PMD at [place] [facility] and was referred to ER for imaging and evaluation. Pt's son noted he called mother at 11 today and she did not  the phone. Pt's daughter went to her apt at 2:30 and found her in her own urine and feces. Pt has h/o full incontinence and wears a diaper. Pt was awake but unable to get up. Daughter helped her into a taxi to see her PMD. Pt currently c/o R shoulder pain, R rib pain, and R thigh pain. Poor historian 2/2 dementia.

## 2017-09-19 NOTE — ED PROVIDER NOTE - PROGRESS NOTE DETAILS
repeat labs (prior were hemolyzed), with sodium 121. called [facility], spoke to NP Lev. she pulled up old records from the office, and notes recent sodium end of august was normal at 133, and prior to that 132. explained results to daughter. will hydrate and repeat, if still low recommend admission. NP from [facility] notes they admit to BI

## 2017-09-20 NOTE — H&P ADULT - NSHPPHYSICALEXAM_GEN_ALL_CORE
.  VITAL SIGNS:  T(C): 36.2 (09-20-17 @ 01:00), Max: 36.8 (09-19-17 @ 19:04)  T(F): 97.2 (09-20-17 @ 01:00), Max: 98.2 (09-19-17 @ 19:04)  HR: 66 (09-20-17 @ 01:00) (66 - 77)  BP: 132/68 (09-20-17 @ 01:00) (96/54 - 167/77)  BP(mean): --  RR: 16 (09-20-17 @ 01:00) (16 - 17)  SpO2: 97% (09-20-17 @ 01:00) (97% - 98%)  Wt(kg): --    PHYSICAL EXAM:    Constitutional: WDWN resting comfortably in bed; NAD  Head: NC/AT  Eyes: PERRL, EOMI, clear conjunctiva  ENT: no nasal discharge; uvula midline, no oropharyngeal erythema or exudates; MMM  Neck: supple; no JVD or thyromegaly  Respiratory: CTA B/L; no W/R/R, no retractions  Cardiac: +S1/S2; RRR; no M/R/G; PMI non-displaced  Gastrointestinal: soft, NT/ND; no rebound or guarding; +BSx4  Back: spine midline, no bony tenderness or step-offs; no CVAT B/L  Extremities: WWP, no clubbing or cyanosis; no peripheral edema  Musculoskeletal: TTP over right shoulder and right upper chest. NROM x4; no joint swelling, tenderness or erythema  Vascular: 2+ radial, DP pulses B/L  Dermatologic: 10cm diameter ecchymotic lesion overlying right shoulder  Lymphatic: no submandibular or cervical LAD  Neurologic: AAOx1; CNII-XII grossly intact; no focal deficits

## 2017-09-20 NOTE — PROGRESS NOTE ADULT - PROBLEM SELECTOR PLAN 4
cont. home CV rx, A/C w/ DOAC; will need to d/w outpatient cardiologist about concerns re: recurrent falls while on A/C

## 2017-09-20 NOTE — PROGRESS NOTE ADULT - SUBJECTIVE AND OBJECTIVE BOX
INTERVAL EVENTS:    Pt notes R anterior shoulder pain, tender to palpation. Description is nonspecific. Also notes pain on right anterior chest (ribs 4-5) and back, tender to palpation and worsened by inspiration. Weakness in RLE. Denies dizziness, headache, changes in vision, palpitations, chest pain, SOB, or abd pain.    Vital Signs Last 12 Hrs  T(F): 97.9 (17 @ 09:11), Max: 97.9 (17 @ 09:11)  HR: 67 (17 @ 09:11) (66 - 69)  BP: 143/74 (17 @ 09:11) (122/64 - 167/77)  BP(mean): --  RR: 16 (17 @ 09:11) (16 - 17)  SpO2: 100% (17 @ 09:11) (97% - 100%)  I&O's Summary    19 Sep 2017 07:01  -  20 Sep 2017 07:00  --------------------------------------------------------  IN: 50 mL / OUT: 0 mL / NET: 50 mL        PHYSICAL EXAM:    Constitutional: NAD, AAOx1, comfortable in bed.   Respiratory: Normal rate, rhythm, depth, effort. CTAB. No w/r/r.   Cardiovascular: RRR, normal S1 and S2, no m/r/g.   Gastrointestinal: +BS, soft NTND.   Extremities: wwp, no edema.   Vascular: Pulses equal and strong throughout.   Neurological: Cranial nerves grossly intact. Upper extremity strength 5/5 b/l, sensation intact. Lower extremity strength 5/5 on left, 3/5 on right, sensation on RLE < LLE.  Skin: No gross skin abnormalities.         LABS:                        12.3   7.3   )-----------( 172      ( 20 Sep 2017 02:38 )             34.7         125<L>  |  89<L>  |  12  ----------------------------<  68<L>  3.4<L>   |  23  |  0.54    Ca    8.4      20 Sep 2017 02:38  Mg     1.6     09-20      PT/INR - ( 19 Sep 2017 21:25 )   PT: 13.4 sec;   INR: 1.21          PTT - ( 19 Sep 2017 21:25 )  PTT:51.9 sec  Urinalysis Basic - ( 19 Sep 2017 21:42 )    Color: Yellow / Appearance: Clear / S.020 / pH: x  Gluc: x / Ketone: 15 mg/dL  / Bili: NEGATIVE / Urobili: 0.2 E.U./dL   Blood: x / Protein: 100 mg/dL / Nitrite: NEGATIVE   Leuk Esterase: NEGATIVE / RBC: < 5 /HPF / WBC < 5 /HPF   Sq Epi: x / Non Sq Epi: Few /HPF / Bacteria: Present /HPF        RADIOLOGY & ADDITIONAL TESTS:    MEDICATIONS  (STANDING):  heparin  Injectable 5000 Unit(s) SubCutaneous every 8 hours  lisinopril 5 milliGRAM(s) Oral daily  propafenone 225 milliGRAM(s) Oral two times a day  simvastatin 10 milliGRAM(s) Oral at bedtime  memantine 20 milliGRAM(s) Oral daily  oxybutynin 5 milliGRAM(s) Oral daily  insulin lispro (HumaLOG) corrective regimen sliding scale   SubCutaneous Before meals and at bedtime  dextrose 5%. 1000 milliLiter(s) (50 mL/Hr) IV Continuous <Continuous>  dextrose 50% Injectable 12.5 Gram(s) IV Push once  dextrose 50% Injectable 25 Gram(s) IV Push once  dextrose 50% Injectable 25 Gram(s) IV Push once    MEDICATIONS  (PRN):  acetaminophen   Tablet 650 milliGRAM(s) Oral every 6 hours PRN For Temp greater than 38.5 C (101.3 F)  dextrose Gel 1 Dose(s) Oral once PRN Blood Glucose LESS THAN 70 milliGRAM(s)/deciliter  glucagon  Injectable 1 milliGRAM(s) IntraMuscular once PRN Glucose LESS THAN 70 milligrams/deciliter INTERVAL EVENTS:    Patient seen and examined at bedside. Pt notes R anterior shoulder pain, tender to palpation. Description is nonspecific. Also notes pain on right anterior chest (ribs 4-5) and back, tender to palpation and worsened by inspiration. Weakness in RLE. Denies dizziness, headache, changes in vision, palpitations, chest pain, SOB, or abd pain.      ID: [id number]    Vital Signs Last 12 Hrs  T(F): 97.9 (17 @ 09:11), Max: 97.9 (17 @ 09:11)  HR: 67 (17 @ 09:11) (66 - 69)  BP: 143/74 (17 @ 09:11) (122/64 - 167/77)  BP(mean): --  RR: 16 (17 @ 09:11) (16 - 17)  SpO2: 100% (17 @ 09:11) (97% - 100%)  I&O's Summary    19 Sep 2017 07:01  -  20 Sep 2017 07:00  --------------------------------------------------------  IN: 50 mL / OUT: 0 mL / NET: 50 mL        PHYSICAL EXAM:    Constitutional: NAD, AAOx1, comfortable in bed.   Respiratory: Normal rate, rhythm, depth, effort. CTAB. No w/r/r.   Cardiovascular: RRR, normal S1 and S2, no m/r/g.   Gastrointestinal: +BS, soft NTND.   Extremities: wwp, no edema.   Vascular: Pulses equal and strong throughout.   Neurological: Cranial nerves grossly intact. Upper extremity strength 5/5 b/l, sensation intact. Lower extremity strength 5/5 on left, 3/5 on right, sensation on RLE < LLE.  Skin: No gross skin abnormalities.       LABS:                        12.3   7.3   )-----------( 172      ( 20 Sep 2017 02:38 )             34.7     -    125<L>  |  89<L>  |  12  ----------------------------<  68<L>  3.4<L>   |  23  |  0.54    Ca    8.4      20 Sep 2017 02:38  Mg     1.6     09-20      PT/INR - ( 19 Sep 2017 21:25 )   PT: 13.4 sec;   INR: 1.21          PTT - ( 19 Sep 2017 21:25 )  PTT:51.9 sec  Urinalysis Basic - ( 19 Sep 2017 21:42 )    Color: Yellow / Appearance: Clear / S.020 / pH: x  Gluc: x / Ketone: 15 mg/dL  / Bili: NEGATIVE / Urobili: 0.2 E.U./dL   Blood: x / Protein: 100 mg/dL / Nitrite: NEGATIVE   Leuk Esterase: NEGATIVE / RBC: < 5 /HPF / WBC < 5 /HPF   Sq Epi: x / Non Sq Epi: Few /HPF / Bacteria: Present /HPF      MEDICATIONS  (STANDING):  heparin  Injectable 5000 Unit(s) SubCutaneous every 8 hours  lisinopril 5 milliGRAM(s) Oral daily  propafenone 225 milliGRAM(s) Oral two times a day  simvastatin 10 milliGRAM(s) Oral at bedtime  memantine 20 milliGRAM(s) Oral daily  oxybutynin 5 milliGRAM(s) Oral daily  insulin lispro (HumaLOG) corrective regimen sliding scale   SubCutaneous Before meals and at bedtime  dextrose 5%. 1000 milliLiter(s) (50 mL/Hr) IV Continuous <Continuous>  dextrose 50% Injectable 12.5 Gram(s) IV Push once  dextrose 50% Injectable 25 Gram(s) IV Push once  dextrose 50% Injectable 25 Gram(s) IV Push once    MEDICATIONS  (PRN):  acetaminophen   Tablet 650 milliGRAM(s) Oral every 6 hours PRN For Temp greater than 38.5 C (101.3 F)  dextrose Gel 1 Dose(s) Oral once PRN Blood Glucose LESS THAN 70 milliGRAM(s)/deciliter  glucagon  Injectable 1 milliGRAM(s) IntraMuscular once PRN Glucose LESS THAN 70 milligrams/deciliter

## 2017-09-20 NOTE — H&P ADULT - ATTENDING COMMENTS
pt seen and examined; reviewed vs, labs, CXR  pt a/f fall, reports R sided chest wall pain on palpation  PE findings as above  a/p:   1. fall: unwitness fall, in setting of gait instability; holding pradaxa for now, PT evaluation, check EEG, obtain collateral from outside cardiology as noted above   2. R chest wall, R shoulder pain: pain control, follow up xray studies   3. left pleural effusion: check CXR PA/ LAT for further evaluation, may need further imaging such as CT chest  4. hyponatremia: monitor BMP , check serum osm, urine studies  5. afib: plan as above

## 2017-09-20 NOTE — PROGRESS NOTE ADULT - PROBLEM SELECTOR PLAN 4
Labs notable for Na 121 --> 124  Received 500cc bolus in LHGV and 125cc/hr of NS  Pt appears euvolemic at this time. Most likely etiology is SIADH possibly 2/2 pain from fall   - f/u serum osm, urine osm, urine Na; if results are consistent with SIADH, should fluid restrict patient  - f/u 2am BMP, trend BMP q8h to ensure Na is not increasing by more than 8meq/24 hrs Labs notable for Na 121 --> 124 -- > 126  Received 500cc bolus in LHGV and 125cc/hr of NS, received 500cc bolus today  Pt appears hypovolemic.  - serum osm = 256 , urine osm = 299 , urine Na = 45   - trend BMP q8h to ensure Na is not increasing by more than 8meq/24 hrs

## 2017-09-20 NOTE — PROGRESS NOTE ADULT - SUBJECTIVE AND OBJECTIVE BOX
Patient is a 89y old  Female who presents with a chief complaint of Fall (20 Sep 2017 02:51)      INTERVAL HPI/OVERNIGHT EVENTS:    Pt. seen and examined at 11:15AM  Per H.S. interview w/  (ID #[id number]): "Pt notes R anterior shoulder pain, tender to palpation. Description is nonspecific. Also notes pain on right anterior chest (ribs 4-5) and back, tender to palpation and worsened by inspiration. Weakness in RLE. Denies dizziness, headache, changes in vision, palpitations, chest pain, SOB, or abd pain."    Review of Systems: 12 point review of systems otherwise negative    MEDICATIONS  (STANDING):  heparin  Injectable 5000 Unit(s) SubCutaneous every 8 hours  lisinopril 5 milliGRAM(s) Oral daily  propafenone 225 milliGRAM(s) Oral two times a day  simvastatin 10 milliGRAM(s) Oral at bedtime  memantine 20 milliGRAM(s) Oral daily  oxybutynin 5 milliGRAM(s) Oral daily  insulin lispro (HumaLOG) corrective regimen sliding scale   SubCutaneous Before meals and at bedtime  dextrose 5%. 1000 milliLiter(s) (50 mL/Hr) IV Continuous <Continuous>  dextrose 50% Injectable 12.5 Gram(s) IV Push once  dextrose 50% Injectable 25 Gram(s) IV Push once  dextrose 50% Injectable 25 Gram(s) IV Push once    MEDICATIONS  (PRN):  acetaminophen   Tablet 650 milliGRAM(s) Oral every 6 hours PRN For Temp greater than 38.5 C (101.3 F)  dextrose Gel 1 Dose(s) Oral once PRN Blood Glucose LESS THAN 70 milliGRAM(s)/deciliter  glucagon  Injectable 1 milliGRAM(s) IntraMuscular once PRN Glucose LESS THAN 70 milligrams/deciliter      Allergies    penicillins (Hives)  shellfish (Unknown)    Intolerances          Vital Signs Last 24 Hrs  T(C): 36.9 (20 Sep 2017 15:53), Max: 36.9 (20 Sep 2017 15:53)  T(F): 98.4 (20 Sep 2017 15:53), Max: 98.4 (20 Sep 2017 15:53)  HR: 61 (20 Sep 2017 15:53) (61 - 77)  BP: 143/90 (20 Sep 2017 15:53) (96/54 - 167/77)  BP(mean): --  RR: 18 (20 Sep 2017 15:53) (16 - 18)  SpO2: 97% (20 Sep 2017 15:53) (97% - 100%)  CAPILLARY BLOOD GLUCOSE  135 (20 Sep 2017 11:29)  77 (20 Sep 2017 07:41)  171 (19 Sep 2017 19:54)           @ 07:01  -   @ 07:00  --------------------------------------------------------  IN: 50 mL / OUT: 0 mL / NET: 50 mL        Physical Exam:  (at 11:15AM)  Daily Height in cm: 156.21 (20 Sep 2017 01:00)    Daily Weight in k.7 (20 Sep 2017 01:00)  General:  well-appearing in NAD  HEENT:  MMM  CV:  RRR, no JVD  Lungs:  CTA B/L  Abdomen:  soft NT ND  Extremities:  no edema B/L LE  Skin:  WWP  Neuro:  alert, non-focal    LABS:                        12.3   7.3   )-----------( 172      ( 20 Sep 2017 02:38 )             34.7     09-20    126<L>  |  91<L>  |  10  ----------------------------<  120<H>  4.1   |  24  |  0.59    Ca    8.9      20 Sep 2017 10:48  Mg     1.6     -20      PT/INR - ( 19 Sep 2017 21:25 )   PT: 13.4 sec;   INR: 1.21          PTT - ( 19 Sep 2017 21:25 )  PTT:51.9 sec  Urinalysis Basic - ( 19 Sep 2017 21:42 )    Color: Yellow / Appearance: Clear / S.020 / pH: x  Gluc: x / Ketone: 15 mg/dL  / Bili: NEGATIVE / Urobili: 0.2 E.U./dL   Blood: x / Protein: 100 mg/dL / Nitrite: NEGATIVE   Leuk Esterase: NEGATIVE / RBC: < 5 /HPF / WBC < 5 /HPF   Sq Epi: x / Non Sq Epi: Few /HPF / Bacteria: Present /HPF          RADIOLOGY & ADDITIONAL TESTS:    ---------------------------------------------------------------------------  I personally reviewed: [  ]EKG   [  ]CXR    [  ] CT    [  ]Other  ---------------------------------------------------------------------------  PLEASE CHECK WHEN PRESENT:     [  ]Heart Failure     [  ] Acute     [  ] Acute on Chronic     [  ] Chronic  -------------------------------------------------------------------     [  ]Diastolic [HFpEF]     [  ]Systolic [HFrEF]     [  ]Combined [HFpEF & HFrEF]     [  ]Other:  -------------------------------------------------------------------  [  ]CASSANDRA     [  ]ATN     [  ]Reneal Medullary Necrosis     [  ]Renal Cortical Necrosis     [  ]Other Pathological Lesions:    [  ]CKD 1  [  ]CKD 2  [  ]CKD 3  [  ]CKD 4  [  ]CKD 5  [  ]Other  -------------------------------------------------------------------  [  ]Other/Unspecified:    --------------------------------------------------------------------    Abdominal Nutritional Status  [  ]Malnutrition: See Nutrition Note  [  ]Cachexia  [  ]Other:   [  ]Supplement Ordered:  [  ]Morbid Obesity (BMI >=40]

## 2017-09-20 NOTE — H&P ADULT - NSHPLABSRESULTS_GEN_ALL_CORE
.  LABS:                         12.3   7.3   )-----------( 172      ( 20 Sep 2017 02:38 )             34.7     09-19    124<L>  |  91<L>  |  14  ----------------------------<  149<H>  3.8   |  23  |  0.76    Ca    8.2<L>      19 Sep 2017 22:19      PT/INR - ( 19 Sep 2017 21:25 )   PT: 13.4 sec;   INR: 1.21          PTT - ( 19 Sep 2017 21:25 )  PTT:51.9 sec  Urinalysis Basic - ( 19 Sep 2017 21:42 )    Color: Yellow / Appearance: Clear / S.020 / pH: x  Gluc: x / Ketone: 15 mg/dL  / Bili: NEGATIVE / Urobili: 0.2 E.U./dL   Blood: x / Protein: 100 mg/dL / Nitrite: NEGATIVE   Leuk Esterase: NEGATIVE / RBC: < 5 /HPF / WBC < 5 /HPF   Sq Epi: x / Non Sq Epi: Few /HPF / Bacteria: Present /HPF      CARDIAC MARKERS ( 19 Sep 2017 21:09 )  0.039 ng/mL / x     / 541 U/L / x     / x                RADIOLOGY, EKG & ADDITIONAL TESTS: Reviewed.

## 2017-09-20 NOTE — H&P ADULT - HISTORY OF PRESENT ILLNESS
89yo Cantonese speaking F w/ PMHx of a.fib (on propafenone for rate control and pradaxa for AC), urinary incontinence, DM (on oral hypoglycemics), dementia (dx'ed in 2014) presents for unwitnessed fall at home. All history obtained from daughter at bedside. Per daughter, she talked to pt on Monday night at 9pm. She called her house on Tuesday morning, but her mom did not . Her brother then went to her mother's house around 12:30pm and found her down on the bathroom floor, alert, but unable to explain what happened. Pt did not have fecal incontinence or tongue biting, but did have urinary incontinence, which is normal as pt has urinary incontinence Pt has had at least 6 falls in the past year. She has fallen on her head before and has had multiple scans, all of which were negative per the daughter. She also has had some outpatient work-up by her Cardiologist, all of which were negative. Pt also had a discussion with her cardiologist regarding pradaxa, and her pradaxa dose was lowered, but not discontinued. Pt has no h/o seizure d/o. Pt has been progressively declining mentally over the last 3 years, becoming more forgetful and suffering from short-term memory loss. She lives alone in an elevator building and has a HHA for 4 hours M-F. Pt ambulates with a cane and/or shopping cart. She walks alone everyday around 5pm. She also is able to bathe and dress herself. She has never been to a nursing home/[facility]. Currently, pt's only complaint is of her right shoulder being in mild pain. Otherwise, she denies headache, fever, chills, nausea, vomiting.     In [facility] ED, T 97.6F, HR 60-70s, -160/70s, RR 17, 97 RA. CT head was neg. CT cervical spine neg for acute fx. Labs notable for Na 121. Given 500cc bolus NS and NS @ 125cc/hr.

## 2017-09-20 NOTE — PROGRESS NOTE ADULT - PROBLEM SELECTOR PLAN 1
unwitnessed; has had cardiac work-up as outpatient, will try to obtain collateral info; no e/o fractures on imaging; f/u results of EEG; cont. PT, fall precautions

## 2017-09-20 NOTE — H&P ADULT - PROBLEM SELECTOR PLAN 6
- c/w home lisinopril 5mg qday pt has been having progressively worsening dementia over last 3 years  - c/w home memantine 20mg qday

## 2017-09-20 NOTE — H&P ADULT - PROBLEM SELECTOR PLAN 4
Pt with h/o afib on propafenone and pradaxa. Currently RRR  - c/w propafenone, but consider changing rate controlling medication ( see above)   - holding pradaxa at this time given h/o falls; will need to have discussion regarding risks vs. benefits of continuing AC   - per daughter, cardiologist decreased pradaxa dose recently but wanted to continue it Labs notable for Na 121 --> 124  Received 500cc bolus in LHGV and 125cc/hr of NS  Pt appears euvolemic at this time. 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

## 2017-09-20 NOTE — PROGRESS NOTE ADULT - PROBLEM SELECTOR PLAN 9
HSQ q8h as we are holding pradaxa. If restarting pradaxa, will dc HSQ Diabetic, Dash/TLC diet  Replete Electrolytes PRN

## 2017-09-20 NOTE — H&P ADULT - PROBLEM SELECTOR PLAN 2
pt has been having progressively worsening dementia over last 3 years  - c/w home memantine 20mg qday pain control, follow up official xray studies

## 2017-09-20 NOTE — CONSULT NOTE ADULT - SUBJECTIVE AND OBJECTIVE BOX
Patient is a 89y old  Female who presents with a chief complaint of Fall (20 Sep 2017 02:51)      HPI:  89yo Cantonese speaking F w/ PMHx of a.fib (on propafenone for rate control and pradaxa for AC), urinary incontinence, DM (on oral hypoglycemics), dementia (dx'ed in ) presents for unwitnessed fall at home. All history obtained from daughter at bedside. Per daughter, she talked to pt on Monday night at 9pm. She called her house on Tuesday morning, but her mom did not . Her brother then went to her mother's house around 12:30pm and found her down on the bathroom floor, alert, but unable to explain what happened. Pt did not have fecal incontinence or tongue biting, but did have urinary incontinence, which is normal as pt has urinary incontinence Pt has had at least 6 falls in the past year. She has fallen on her head before and has had multiple scans, all of which were negative per the daughter. She also has had some outpatient work-up by her Cardiologist, all of which were negative. Pt also had a discussion with her cardiologist regarding pradaxa, and her pradaxa dose was lowered, but not discontinued. Pt has no h/o seizure d/o. Pt has been progressively declining mentally over the last 3 years, becoming more forgetful and suffering from short-term memory loss. She lives alone in an elevator building and has a HHA for 4 hours M-F. Pt ambulates with a cane and/or shopping cart. She walks alone everyday around 5pm. She also is able to bathe and dress herself. She has never been to a nursing home/[facility]. Currently, pt's only complaint is of her right shoulder being in mild pain. Otherwise, she denies headache, fever, chills, nausea, vomiting.     In [facility] ED, T 97.6F, HR 60-70s, -160/70s, RR 17, 97 RA. CT head was neg. CT cervical spine neg for acute fx. Labs notable for Na 121. Given 500cc bolus NS and NS @ 125cc/hr. (20 Sep 2017 02:51)      PAST MEDICAL & SURGICAL HISTORY:  Dementia  Afib  Diabetes mellitus  Hypertension  History of nephrectomy, unilateral  H/O: hysterectomy      MEDICATIONS  (STANDING):  heparin  Injectable 5000 Unit(s) SubCutaneous every 8 hours  lisinopril 5 milliGRAM(s) Oral daily  propafenone 225 milliGRAM(s) Oral two times a day  simvastatin 10 milliGRAM(s) Oral at bedtime  memantine 20 milliGRAM(s) Oral daily  oxybutynin 5 milliGRAM(s) Oral daily  insulin lispro (HumaLOG) corrective regimen sliding scale   SubCutaneous Before meals and at bedtime  dextrose 5%. 1000 milliLiter(s) (50 mL/Hr) IV Continuous <Continuous>  dextrose 50% Injectable 12.5 Gram(s) IV Push once  dextrose 50% Injectable 25 Gram(s) IV Push once  dextrose 50% Injectable 25 Gram(s) IV Push once    MEDICATIONS  (PRN):  acetaminophen   Tablet 650 milliGRAM(s) Oral every 6 hours PRN For Temp greater than 38.5 C (101.3 F)  dextrose Gel 1 Dose(s) Oral once PRN Blood Glucose LESS THAN 70 milliGRAM(s)/deciliter  glucagon  Injectable 1 milliGRAM(s) IntraMuscular once PRN Glucose LESS THAN 70 milligrams/deciliter      Social History: , has a son and daughter, lives alone in an elevator accessible apartment building, has HHA 4 hrs Monday thru Friday    Functional Level Prior to Admission: ADL independent, walks with a cane/shopping cart, walks daily in community alone    FAMILY HISTORY:  No pertinent family history in first degree relatives      CBC Full  -  ( 20 Sep 2017 02:38 )  WBC Count : 7.3 K/uL  Hemoglobin : 12.3 g/dL  Hematocrit : 34.7 %  Platelet Count - Automated : 172 K/uL  Mean Cell Volume : 88.5 fL  Mean Cell Hemoglobin : 31.4 pg  Mean Cell Hemoglobin Concentration : 35.4 g/dL  Auto Neutrophil # : x  Auto Lymphocyte # : x  Auto Monocyte # : x  Auto Eosinophil # : x  Auto Basophil # : x  Auto Neutrophil % : x  Auto Lymphocyte % : x  Auto Monocyte % : x  Auto Eosinophil % : x  Auto Basophil % : x          125<L>  |  89<L>  |  12  ----------------------------<  68<L>  3.4<L>   |  23  |  0.54    Ca    8.4      20 Sep 2017 02:38  Mg     1.6     09-20        Urinalysis Basic - ( 19 Sep 2017 21:42 )    Color: Yellow / Appearance: Clear / S.020 / pH: x  Gluc: x / Ketone: 15 mg/dL  / Bili: NEGATIVE / Urobili: 0.2 E.U./dL   Blood: x / Protein: 100 mg/dL / Nitrite: NEGATIVE   Leuk Esterase: NEGATIVE / RBC: < 5 /HPF / WBC < 5 /HPF   Sq Epi: x / Non Sq Epi: Few /HPF / Bacteria: Present /HPF          Radiology:    < from: CT Head No Cont (17 @ 20:41) >  EXAM:  CT BRAIN                           PROCEDURE DATE:  2017                     INTERPRETATION:  CT OF THE HEAD WITHOUT CONTRAST    INDICATION:  trauma    TECHNIQUE: An axial noncontrast CT scan of the head was performed.   Sagittal and coronal reformatted images were also obtained.    CONTRAST:  None    COMPARISON:  None    FINDINGS:  There is ventricular and sulcal prominence consistent with generalized   age related cerebral volume loss. There is no hydrocephalus. The basal   cisterns are patent. There is no focal mass effect or midline shift.   There are no extra-axial collections or intraparenchymal hemorrhage.    There are scattered regions of hypodensity involving the periventricular   and subcortical white matter consistent with chronic microvascular   ischemic changes. There is no evidence of acute transcortical territorial   infarction.    The globes and retrobulbar fat are intact.    The mastoids and paranasal sinuses are well aerated. The calvaria is   intact.    IMPRESSION:  No hydrocephalus, midline shift, acute intracranial hemorrhage or   demarcated territorial infarct.          < from: CT Cervical Spine No Cont (17 @ 20:41) >  EXAM:  CT CERVICAL SPINE                           PROCEDURE DATE:  2017                     INTERPRETATION:  -  CT OF THE CERVICAL SPINE:      Clinical information: 88 y/o female, S/P fall, neck trauma, neck pain.     Helical axial scans were obtained from the foramen magnum to T3 at 3 mm   interval.  The isotropic dataset was used to create sagittal and coronal   reformatted images. 3D colored and volumetric reformatted images were   also created.      No fracture is seen.    A slight degree or diffuse osteoporosis is noted. Osteophyte formation is   seen from the margin of the vertebral bodies at the interspace levels   from C3 to T1. Vacuum phenomenon is seen in the disc at C6-C7 level   secondary to old degenerative disc disease.    Productive bony changes are noted in the articular processes from C2 to   C5 on left and from C2 to C7 on right. Bony fusion of the right articular   facet joints is seen from C3 to C7 as a result of old degenerative joint   disease. Minimal anterior displacement of the C3 vertebral body in   relation to C4 is noted, constituting grade 1 spondylolisthesis.    The head and cervical spine spine are slightly tilted to left due to   posturing.      The bony spinal canal is within normal limits in size.  There is   narrowing of the right neural foramina from C2 to C7 and at C3-C4, C5-C6,   C6-C7 levels on left.    The soft tissues within the spinal canal are fairly well defined. No   posterior disc protrusion or intraspinal mass is seen.    Thecraniovertebral junction appears normal.  The atlantoaxial joints are   well preserved.  The paraspinal soft tissues are unremarkable. Left   pleural effusion is evident.    IMPRESSION: -    1.  No fracture in the cervical spine.     2.  Spondylosis from C3 to T1.    3.  Productive bony changes in the articular processes from C2 to C5 on   left and from C2 to C7 on right. Bony fusion of the right articular facet   joints is noted from C3 to C7.      4.  Grade 1 spondylolisthesis listhesis of the C3 vertebral body in   relation to C4.                    Vital Signs Last 24 Hrs  T(C): 36.6 (20 Sep 2017 09:11), Max: 36.8 (19 Sep 2017 19:04)  T(F): 97.9 (20 Sep 2017 09:11), Max: 98.2 (19 Sep 2017 19:04)  HR: 67 (20 Sep 2017 09:11) (66 - 77)  BP: 143/74 (20 Sep 2017 09:11) (96/54 - 167/77)  BP(mean): --  RR: 16 (20 Sep 2017 09:11) (16 - 17)  SpO2: 100% (20 Sep 2017 09:11) (97% - 100%)    REVIEW OF SYSTEMS:    CONSTITUTIONAL: No fever, weight loss, or fatigue  EYES: No eye pain, visual disturbances, or discharge  ENMT:  No difficulty hearing, tinnitus, vertigo; No sinus or throat pain  NECK: No pain or stiffness  BREASTS: No pain, masses, or nipple discharge  RESPIRATORY: No cough, wheezing, chills or hemoptysis; No shortness of breath  CARDIOVASCULAR: No chest pain, palpitations, dizziness, or leg swelling  GASTROINTESTINAL: No abdominal or epigastric pain. No nausea, vomiting, or hematemesis; No diarrhea or constipation. No melena or hematochezia.  GENITOURINARY: No dysuria, frequency, hematuria, or incontinence  NEUROLOGICAL: No headaches, memory loss, loss of strength, numbness, or tremors  SKIN: No itching, burning, rashes, or lesions   LYMPH NODES: No enlarged glands  ENDOCRINE: No heat or cold intolerance; No hair loss  MUSCULOSKELETAL: right shoulder pain  PSYCHIATRIC: No depression, anxiety, mood swings, or difficulty sleeping  HEME/LYMPH: No easy bruising, or bleeding gums  ALLERGY AND IMMUNOLOGIC: No hives or eczema      Physical Exam: WDWN  woman lying in bed, c/o right shoulder pain    HEENT: normocephalic,  atraumatic, anicteric    Neck: supple, negative JVD, negative carotid bruits,    Chest: CTA bilaterally, neg wheeze, rhonchi, rales, crackles, egophany    Cardiovascular: regular rate and rhythm, neg murmurs/rubs/gallops    Abdomen: soft, non distended, non tender, negative rebound/guarding, normal bowel sounds, neg hepatosplenomegaly    Extremities: WWP, neg cyanosis/clubbing/edema, negative calf tenderness to palpation, negative Lisa's sign    Right shoulder: + ecchymosis, - hematoma, range of motion limited in abduction secondary to pain, TTP overlying subacromion and proximal humerus    :     Neurologic Exam:    Alert and oriented x 1 to person, baseline per daughter, speech fluent w/o dysarthria- Cantonese    Cranial Nerves:     II:                       pupils equal, round and reactive to light, visual fields intact   III/ IV/VI:             extraocular movements intact, neg nystagmus, ptosis  V:                      facial sensation intact, V1-3 normal  VII:                     face symmetric, no droop, normal eye closure and smile  VIII:                    hearing intact to finger rub bilaterally  IX/ X:                  soft palate rise symmetrical  XI:                      head turning, shoulder shrug normal  XII:                     tongue midline    Motor Exam:    Bilateral UE:         4+/5 /intrinsics                            Left 5/5 biceps/triceps/wrist extensors-flexors/deltoid, Right deltoid pain limited                            negative pronator drift      Bilateral LE:         4/5 hip flexors/adductors/abductors                            4+/5 quadriceps/hamstrings                            5/5 dorsiflexors/plantar flexors/invertors-evertors    Sensory: intact to LT/PP in all UE/LE dermatomes    DTR:        = biceps/     triceps/     brachioradialis                 = patella/   medial hamstring/    ankle                 neg clonus                 neg Babinski                 neg Hoffmans    Finger to Nose: left wnl, right limited secondary to shoulder pain    Heel to Shin:      wnl        Romberg: NT    Tandem Walking: NT    Gait:  NT        PM&R Impression:    1) s/p fall  2) right shoulder contusion/ traumatic subacromial bursitis/ r/o proximal humerus fx  3) deconditioned      Recommendations:    1) XR right humerus    2) Physical therapy focusing on therapeutic exercises, bed mobility/transfer out of bed evaluation, progressive ambulation with assistive devices.    3) Disposition Plan: pending functional progress

## 2017-09-20 NOTE — PROGRESS NOTE ADULT - PROBLEM SELECTOR PLAN 1
Pt had unwitnessed fall at home, found down on bathroom floor, alert but unable to explain what happened. This is patient's 6th or 7th fall this year. Pt has h/o progressively worsening gait instability. Uses cane or shopping cart to ambulate. Lives alone in elevator building. Has HHA 4 hrs mon-fri.   - CT head neg for acute bleed  - will hold home pradaxa at this time given h/o falls; spoke to daughter about discussion with her cardiologist regarding the risks vs. benefits of continuing pradaxa in setting of multiple falls  - f/u b12, folate, tsh  - low suspicion for seizure, but will obtain EEG for full evaluation   - obtain collateral in AM from Cardiologist as daughter states that she has had multiple cardiac tests to r/o cardiac etiology  - would consider dc'ing propafenone for rate control and initiate other rate controlling medication as propafenone can cause dizziness and ataxia; will need to discuss with patient's cardiologist in AM  - SW consulted Pt had unwitnessed fall at home, found down on bathroom floor, alert but unable to explain what happened. This is patient's 6th or 7th fall this year. Pt has h/o progressively worsening gait instability. Uses cane or shopping cart to ambulate. Lives alone in elevator building. Has HHA 4 hrs mon-fri.   - CT head neg for acute bleed  - will hold home pradaxa at this time given h/o falls; spoke to daughter about discussion with her cardiologist regarding the risks vs. benefits of continuing pradaxa in setting of multiple falls  - B12 = 1670 , Folate = >20 , TSH= 0.945  - low suspicion for seizure, but ordered 20min EEG  - obtain collateral from Cardiologist as daughter states that she has had multiple cardiac tests to r/o cardiac etiology, left message and callback number  - would consider dc'ing propafenone for rate control and initiate other rate controlling medication as propafenone can cause dizziness and ataxia; will need to discuss with patient's cardiologist   - GRICEL consulted - patient to be d/doreen to [facility]

## 2017-09-20 NOTE — PHYSICAL THERAPY INITIAL EVALUATION ADULT - ORIENTATION, REHAB EVAL
person/able to state that she is at a hospital but not the name of the hospital, states the month is march and she does not know the year/place

## 2017-09-20 NOTE — H&P ADULT - PROBLEM SELECTOR PLAN 3
Labs notable for Na 121 --> 124  Received 500cc bolus in [facility] and 125cc/hr of NS  Pt appears euvolemic at this time. Most likely etiology is SIADH possibly 2/2 pain from fall   - f/u serum osm, urine osm, urine Na; if results are consistent with SIADH, should fluid restrict patient  - f/u 2am BMP, trend BMP q8h to ensure Na is not increasing by more than 8meq/24 hrs check CXR PA/ LAT, may need further imaging such as CT chest

## 2017-09-20 NOTE — PHYSICAL THERAPY INITIAL EVALUATION ADULT - IMPAIRMENTS FOUND, PT EVAL
gait, locomotion, and balance/cognitive impairment/aerobic capacity/endurance/muscle strength/poor safety awareness

## 2017-09-20 NOTE — PROGRESS NOTE ADULT - ASSESSMENT
88yo Cantonese speaking F w/ PMHx of a.fib (on propafenone for rate control and pradaxa for AC), urinary incontinence, DM (on oral hypoglycemics), dementia (dx'ed in 2014) presents for unwitnessed fall at home.

## 2017-09-20 NOTE — PROGRESS NOTE ADULT - PROBLEM SELECTOR PLAN 10
Diabetic, Dash/TLC diet DVT PPX: HSQ q8h as we are holding pradaxa. If restarting pradaxa, will dc HSQ  ETHICS: Full Code  Disposition: Pending clinical improvement. [facility] placement.

## 2017-09-20 NOTE — PHYSICAL THERAPY INITIAL EVALUATION ADULT - GAIT DEVIATIONS NOTED, PT EVAL
decreased weight-shifting ability/decreased step length/decreased flynn/increased time in double stance

## 2017-09-20 NOTE — PHYSICAL THERAPY INITIAL EVALUATION ADULT - ADDITIONAL COMMENTS
Pt primarily Cantonese speaking and A&Ox2, social hx taken from H&P. Pt has had at least 6 falls in the past year. She has fallen on her head before and has had multiple scans, all of which were negative per the daughter. Pt has been progressively declining mentally over the last 3 years, becoming more forgetful and suffering from short-term memory loss. She lives alone in an elevator building and has a HHA for 4 hours M-F. Pt ambulates with a cane and/or shopping cart. She walks alone everyday around 5pm. She also is able to bathe and dress herself. She has never been to a nursing home/[facility].

## 2017-09-20 NOTE — PROGRESS NOTE ADULT - PROBLEM SELECTOR PLAN 6
pt has been having progressively worsening dementia over last 3 years  - c/w home memantine 20mg qday

## 2017-09-20 NOTE — H&P ADULT - ASSESSMENT
89yo Cantonese speaking F w/ PMHx of a.fib (on propafenone for rate control and pradaxa for AC), urinary incontinence, DM (on oral hypoglycemics), dementia (dx'ed in 2014) presents for unwitnessed fall at home.

## 2017-09-20 NOTE — CONSULT NOTE ADULT - ASSESSMENT
88yo Cantonese speaking F w/ PMHx of a.fib (on propafenone for rate control and pradaxa for AC), urinary incontinence, DM (on oral hypoglycemics), dementia (dx'ed in 2014) presents for unwitnessed fall at home.    Problem/Plan - 1:  ·  Problem: Fall, initial encounter.  Plan: Pt had unwitnessed fall at home, found down on bathroom floor, alert but unable to explain what happened. This is patient's 6th or 7th fall this year. Pt has h/o progressively worsening gait instability. Uses cane or shopping cart to ambulate. Lives alone in elevator building. Has HHA 4 hrs mon-fri.   - CT head neg for acute bleed  - will hold home pradaxa at this time given h/o falls; spoke to daughter about discussion with her cardiologist regarding the risks vs. benefits of continuing pradaxa in setting of multiple falls  - f/u b12, folate, tsh  - low suspicion for seizure, but will obtain EEG for full evaluation   - obtain collateral in AM from Cardiologist as daughter states that she has had multiple cardiac tests to r/o cardiac etiology  - would consider dc'ing propafenone for rate control and initiate other rate controlling medication as propafenone can cause dizziness and ataxia; will need to discuss with patient's cardiologist in AM  - SW consulted.     Problem/Plan - 2:  ·  Problem: Chest wall pain. Dementia  Plan: pain control, follow up official xray studies. pt has been having progressively worsening dementia over last 3 years  - c/w home memantine 20mg qday     Problem/Plan - 3:  ·  Problem: Pleural effusion, left. Hyponatremia  Plan: check CXR PA/ LAT, may need further imaging such as CT chest. Labs notable for Na 121 --> 124  Received 500cc bolus in [facility] and 125cc/hr of NS  Pt appears euvolemic at this time. Most likely etiology is SIADH possibly 2/2 pain from fall   - f/u serum osm, urine osm, urine Na; if results are consistent with SIADH, should fluid restrict patient  - f/u 2am BMP, trend BMP q8h to ensure Na is not increasing by more than 8meq/24 hrs     Problem/Plan - 4:  ·  Problem: Hyponatremia. Chronic atrial fibrillation  Plan: Labs notable for Na 121 --> 124  Received 500cc bolus in [facility] and 125cc/hr of NS  Pt appears euvolemic at this time. Most likely etiology is SIADH possibly 2/2 pain from fall   - f/u serum osm, urine osm, urine Na; if results are consistent with SIADH, should fluid restrict patient  - f/u 2am BMP, trend BMP q8h to ensure Na is not increasing by more than 8meq/24 hrs. Pt with h/o afib on propafenone and pradaxa. Currently RRR  - c/w propafenone, but consider changing rate controlling medication ( see above)   - holding pradaxa at this time given h/o falls; will need to have discussion regarding risks vs. benefits of continuing AC   - per daughter, cardiologist decreased pradaxa dose recently but wanted to continue it

## 2017-09-20 NOTE — H&P ADULT - PROBLEM SELECTOR PLAN 1
Pt had unwitnessed fall at home, found down on bathroom floor, alert but unable to explain what happened. This is patient's 6th or 7th fall this year. Pt has h/o progressively worsening gait instability. Uses cane or shopping cart to ambulate. Lives alone in elevator building. Has HHA 4 hrs mon-fri.   - CT head neg for acute bleed  - will hold home pradaxa at this time given h/o falls; spoke to daughter about discussion with her cardiologist regarding the risks vs. benefits of continuing pradaxa in setting of multiple falls  - f/u b12, folate, tsh  - low suspicion for seizure, but will obtain EEG for full evaluation   - obtain collateral in AM from Cardiologist as daughter states that she has had multiple cardiac tests to r/o cardiac etiology  - would consider dc'ing propafenone for rate control and initiate other rate controlling medication as propafenone can cause dizziness and ataxia; will need to discuss with patient's cardiologist in AM  - SW consulted

## 2017-09-20 NOTE — PHYSICAL THERAPY INITIAL EVALUATION ADULT - TRANSFER SAFETY CONCERNS NOTED: SIT/STAND, REHAB EVAL
decreased sequencing ability/decreased safety awareness/losing balance/decreased step length/decreased balance during turns/decreased weight-shifting ability

## 2017-09-20 NOTE — H&P ADULT - PROBLEM SELECTOR PLAN 7
HSQ q8h as we are holding pradaxa. If restarting pradaxa, will dc HSQ - holding home glipizide, metformin, januvia  - will start medium ISS  - f/u A1c in AM

## 2017-09-20 NOTE — PROGRESS NOTE ADULT - PROBLEM SELECTOR PLAN 2
hypo-osmolar, #s improved w/ fluids; encourage oral hydration; IVF d/c'ed given effusion on CXR; monitor BMP; possibly related to fall?

## 2017-09-20 NOTE — PROGRESS NOTE ADULT - PROBLEM SELECTOR PLAN 7
- holding home glipizide, metformin, januvia  - will start medium ISS  - f/u A1c in AM - holding home glipizide, metformin, Januvia  - will start medium ISS  - A1c = 6.8

## 2017-09-20 NOTE — PHYSICAL THERAPY INITIAL EVALUATION ADULT - CRITERIA FOR SKILLED THERAPEUTIC INTERVENTIONS
anticipated discharge recommendation/impairments found/therapy frequency/functional limitations in following categories/risk reduction/prevention/rehab potential

## 2017-09-20 NOTE — H&P ADULT - PROBLEM SELECTOR PLAN 5
- holding home glipizide, metformin, januvia  - will start medium ISS  - f/u A1c in AM Pt with h/o afib on propafenone and pradaxa. Currently RRR  - c/w propafenone, but consider changing rate controlling medication ( see above)   - holding pradaxa at this time given h/o falls; will need to have discussion regarding risks vs. benefits of continuing AC   - per daughter, cardiologist decreased pradaxa dose recently but wanted to continue it

## 2017-09-21 NOTE — PROGRESS NOTE ADULT - PROBLEM SELECTOR PLAN 3
check CXR PA/ LAT, may need further imaging such as CT chest CXR PA/ LAT showed L pleural effusion however no intervention needed at this time.

## 2017-09-21 NOTE — PROGRESS NOTE ADULT - ASSESSMENT
A 89yo Cantonese speaking F w/ PMH of MILANA.fib (on propafenone for rate control and pradaxa for AC), urinary incontinence, DM (on oral hypoglycemics), dementia (dx'ed in 2014) presents for unwitnessed fall at home.   89yo Cantonese speaking F w/ PMHx of milana.fib (on propafenone for rate control and pradaxa for AC), urinary incontinence, DM (on oral hypoglycemics), dementia (dx'ed in 2014) presents for unwitnessed fall at home.     Problem/Plan - 1:  ·  Problem: Fall, initial encounter.  Plan: Pt had unwitnessed fall at home, found down on bathroom floor, alert but unable to explain what happened. This is patient's 6th or 7th fall this year. Pt has h/o progressively worsening gait instability. Uses cane or shopping cart to ambulate. Lives alone in elevator building. Has HHA 4 hrs mon-fri.   - CT head neg for acute bleed  - restarted home Pradaxa; spoke to daughter about discussion with her cardiologist regarding the risks vs. benefits of continuing pradaxa in setting of multiple falls  - B12 = 1670 , Folate = >20 , TSH= 0.945  - low suspicion for seizure, but ordered 20min EEG  - obtain collateral from Cardiologist as daughter states that she has had multiple cardiac tests to r/o cardiac etiology, left message and callback number  - would consider dc'ing propafenone for rate control and initiate other rate controlling medication as propafenone can cause dizziness and ataxia; will need to discuss with patient's cardiologist   - SW consulted - patient to be d/doreen to [facility]. Pt had unwitnessed fall at home, found down on bathroom floor, alert but unable to explain what happened. This is patient's 6th or 7th fall this year. Pt has h/o progressively worsening gait instability. Uses cane or shopping cart to ambulate. Lives alone in elevator building. Has HHA 4 hrs mon-fri.   - CT head neg for acute bleed  - will hold home pradaxa at this time given h/o falls; spoke to daughter about discussion with her cardiologist regarding the risks vs. benefits of continuing pradaxa in setting of multiple falls  - B12 = 1670 , Folate = >20 , TSH= 0.945  - low suspicion for seizure, but ordered 20min EEG  - obtain collateral from Cardiologist as daughter states that she has had multiple cardiac tests to r/o cardiac etiology, left message and callback number  - would consider dc'ing propafenone for rate control and initiate other rate controlling medication as propafenone can cause dizziness and ataxia; will need to discuss with patient's cardiologist   - SW consulted - patient to be d/doreen to [facility]     Problem/Plan - 2:  ·  Problem: Chest wall pain.  Plan: Musculoskeletal in nature due to tenderness to palpation and exacerbation with movement and inspiration. Pain control, xrays unremarkable for fractures, no fractures of rib on R anterior chest. Musculoskeletal in nature due to tenderness to palpation and exacerbation with movement and inspiration. Pain control, follow up official xray studies: no fractures of rib on R anterior chest     Problem/Plan - 3:  ·  Problem: Pleural effusion, left.  Plan: CXR PA/ LAT showed L pleural effusion however no intervention needed at this time. check CXR PA/ LAT, may need further imaging such as CT chest     Problem/Plan - 4:  ·  Problem: Hyponatremia.  Plan: Labs notable for Na 121 --> 124 -- > 126 --> 132  Received 500cc bolus in [facility] and 125cc/hr of NS after admission, received 500cc bolus yesterday afternoon. [Na] increased by 7 mEq/L within 24 hrs  Pt appears less hypovolemic.  - serum osm = 256 , urine osm = 299 , urine Na = 45 (9/20 labs). Labs notable for Na 121 --> 124 -- > 126 --> 132  Received 500cc bolus in [facility]V and 125cc/hr of NS after admission, received 500cc bolus yesterday afternoon. [Na] increased by 7 mEq/L within 24 hrs, will wait for afternoon labs to see if more NS is needed,   Pt appears less hypovolemic.  - serum osm = 256 , urine osm = 299 , urine Na = 45 (9/20 labs)  - trend BMP q8h to ensure Na is not increasing by more than 8meq/24 hrs

## 2017-09-21 NOTE — PROGRESS NOTE ADULT - PROBLEM SELECTOR PLAN 1
Pt had unwitnessed fall at home, found down on bathroom floor, alert but unable to explain what happened. This is patient's 6th or 7th fall this year. Pt has h/o progressively worsening gait instability. Uses cane or shopping cart to ambulate. Lives alone in elevator building. Has HHA 4 hrs mon-fri.   - CT head neg for acute bleed  - will hold home pradaxa at this time given h/o falls; spoke to daughter about discussion with her cardiologist regarding the risks vs. benefits of continuing pradaxa in setting of multiple falls  - B12 = 1670 , Folate = >20 , TSH= 0.945  - low suspicion for seizure, but ordered 20min EEG  - obtain collateral from Cardiologist as daughter states that she has had multiple cardiac tests to r/o cardiac etiology, left message and callback number  - would consider dc'ing propafenone for rate control and initiate other rate controlling medication as propafenone can cause dizziness and ataxia; will need to discuss with patient's cardiologist   - GRICEL consulted - patient to be d/doreen to [facility] Pt had unwitnessed fall at home, found down on bathroom floor, alert but unable to explain what happened. This is patient's 6th or 7th fall this year. Pt has h/o progressively worsening gait instability. Uses cane or shopping cart to ambulate. Lives alone in elevator building. Has HHA 4 hrs mon-fri.   - CT head neg for acute bleed  - restarted home Pradaxa; spoke to daughter about discussion with her cardiologist regarding the risks vs. benefits of continuing pradaxa in setting of multiple falls  - B12 = 1670 , Folate = >20 , TSH= 0.945  - low suspicion for seizure, but ordered 20min EEG  - obtain collateral from Cardiologist as daughter states that she has had multiple cardiac tests to r/o cardiac etiology, left message and callback number  - would consider dc'ing propafenone for rate control and initiate other rate controlling medication as propafenone can cause dizziness and ataxia; will need to discuss with patient's cardiologist   - SW consulted - patient to be d/doreen to [facility]

## 2017-09-21 NOTE — PROGRESS NOTE ADULT - PROBLEM SELECTOR PLAN 5
Pt with h/o afib on propafenone and Pradaxa. Currently RRR  - c/w propafenone, but consider changing rate controlling medication (see above)   - holding pradaxa at this time given h/o falls; will need to have discussion regarding risks vs. benefits of continuing AC   - per daughter, cardiologist decreased pradaxa dose recently but wanted to continue it Pt with h/o afib on propafenone and Pradaxa. Currently RRR  - c/w propafenone, but consider changing rate controlling medication (see above)   - continuing home dose of pradaxa; will need to have discussion regarding risks vs. benefits of continuing vs holding AC   - per daughter, cardiologist decreased pradaxa dose recently but wanted to continue it Pt with h/o afib on propafenone and Pradaxa. Currently RRR  - c/w propafenone, but consider changing rate controlling medication (see above)   - continuing home dose of Pradaxa; will need to have discussion regarding risks vs. benefits of continuing vs holding AC   - per daughter, cardiologist decreased Pradaxa dose recently but wanted to continue it

## 2017-09-21 NOTE — PROGRESS NOTE ADULT - PROBLEM SELECTOR PLAN 9
Diabetic, Dash/TLC diet  Replete Electrolytes PRN: Mg = 1.7, replete Diabetic, Dash/TLC diet  Replete Electrolytes PRN: Mg = 1.7, repleted with 2g IV Mg

## 2017-09-21 NOTE — PROGRESS NOTE ADULT - ASSESSMENT
88yo Cantonese speaking F w/ PMHx of a.fib (on propafenone for rate control and pradaxa for AC), urinary incontinence, DM (on oral hypoglycemics), dementia (dx'ed in 2014) presents for unwitnessed fall at home. A 89yo Cantonese speaking F w/ PMH of A.fib (on propafenone for rate control and pradaxa for AC), urinary incontinence, DM (on oral hypoglycemics), dementia (dx'ed in 2014) presents for unwitnessed fall at home.

## 2017-09-21 NOTE — PROGRESS NOTE ADULT - SUBJECTIVE AND OBJECTIVE BOX
Physical Medicine and Rehabilitation Progress Note:    Patient is a 89y old  Female who presents with a chief complaint of Fall (20 Sep 2017 02:51)      HPI:  89yo Cantonese speaking F w/ PMHx of a.fib (on propafenone for rate control and pradaxa for AC), urinary incontinence, DM (on oral hypoglycemics), dementia (dx'ed in 2014) presents for unwitnessed fall at home. All history obtained from daughter at bedside. Per daughter, she talked to pt on Monday night at 9pm. She called her house on Tuesday morning, but her mom did not . Her brother then went to her mother's house around 12:30pm and found her down on the bathroom floor, alert, but unable to explain what happened. Pt did not have fecal incontinence or tongue biting, but did have urinary incontinence, which is normal as pt has urinary incontinence Pt has had at least 6 falls in the past year. She has fallen on her head before and has had multiple scans, all of which were negative per the daughter. She also has had some outpatient work-up by her Cardiologist, all of which were negative. Pt also had a discussion with her cardiologist regarding pradaxa, and her pradaxa dose was lowered, but not discontinued. Pt has no h/o seizure d/o. Pt has been progressively declining mentally over the last 3 years, becoming more forgetful and suffering from short-term memory loss. She lives alone in an elevator building and has a HHA for 4 hours M-F. Pt ambulates with a cane and/or shopping cart. She walks alone everyday around 5pm. She also is able to bathe and dress herself. She has never been to a nursing home/[facility]. Currently, pt's only complaint is of her right shoulder being in mild pain. Otherwise, she denies headache, fever, chills, nausea, vomiting.     In [facility] ED, T 97.6F, HR 60-70s, -160/70s, RR 17, 97 RA. CT head was neg. CT cervical spine neg for acute fx. Labs notable for Na 121. Given 500cc bolus NS and NS @ 125cc/hr. (20 Sep 2017 02:51)                            13.3   7.4   )-----------( 197      ( 21 Sep 2017 06:10 )             38.3       09-21    132<L>  |  96  |  10  ----------------------------<  149<H>  4.1   |  23  |  0.52    Ca    9.1      21 Sep 2017 06:07  Phos  2.6     09-21  Mg     1.7     09-21      Vital Signs Last 24 Hrs  T(C): 36.3 (21 Sep 2017 10:32), Max: 36.9 (20 Sep 2017 15:53)  T(F): 97.3 (21 Sep 2017 10:32), Max: 98.4 (20 Sep 2017 15:53)  HR: 72 (21 Sep 2017 10:32) (61 - 72)  BP: 157/74 (21 Sep 2017 10:32) (113/60 - 157/74)  BP(mean): --  RR: 18 (21 Sep 2017 10:32) (18 - 18)  SpO2: 97% (21 Sep 2017 10:32) (96% - 97%)    MEDICATIONS  (STANDING):  lisinopril 5 milliGRAM(s) Oral daily  propafenone 225 milliGRAM(s) Oral two times a day  simvastatin 10 milliGRAM(s) Oral at bedtime  memantine 20 milliGRAM(s) Oral daily  oxybutynin 5 milliGRAM(s) Oral daily  insulin lispro (HumaLOG) corrective regimen sliding scale   SubCutaneous Before meals and at bedtime  dextrose 5%. 1000 milliLiter(s) (50 mL/Hr) IV Continuous <Continuous>  dextrose 50% Injectable 12.5 Gram(s) IV Push once  dextrose 50% Injectable 25 Gram(s) IV Push once  dextrose 50% Injectable 25 Gram(s) IV Push once  dabigatran 75 milliGRAM(s) Oral every 12 hours    MEDICATIONS  (PRN):  acetaminophen   Tablet 650 milliGRAM(s) Oral every 6 hours PRN For Temp greater than 38.5 C (101.3 F)  dextrose Gel 1 Dose(s) Oral once PRN Blood Glucose LESS THAN 70 milliGRAM(s)/deciliter  glucagon  Injectable 1 milliGRAM(s) IntraMuscular once PRN Glucose LESS THAN 70 milligrams/deciliter    Currently Undergoing Physical Therapy at bedside.    Initial Functional Status Assessment:    Previous Level of Function:     · Ambulation Skills	needs device and assist	  · Transfer Skills	needs device and assist	  · ADL Skills	needs device and assist	  · Additional Comments	Pt primarily Cantonese speaking and A&Ox2, social hx taken from H&P. Pt has had at least 6 falls in the past year. She has fallen on her head before and has had multiple scans, all of which were negative per the daughter. Pt has been progressively declining mentally over the last 3 years, becoming more forgetful and suffering from short-term memory loss. She lives alone in an elevator building and has a HHA for 4 hours M-F. Pt ambulates with a cane and/or shopping cart. She walks alone everyday around 5pm. She also is able to bathe and dress herself. She has never been to a nursing home/[facility].	    Cognitive Status Examination:   · Orientation	person; place; able to state that she is at a hospital but not the name of the hospital, states the month is march and she does not know the year	  · Level of Consciousness	alert; confused	  · Follows Commands and Answers Questions	75% of the time	  · Personal Safety and Judgment	impaired; grabbing on to furniture for support while ambulating	    Peripheral Neurovascular:     Neurovascular Assessment:   · LLE	warm; no discoloration; no tingling; no numbness	  · RLE	warm; no discoloration; no numbness; no tingling	    Range of Motion Exam:   · Range of Motion Examination	no ROM deficits were identified	    Manual Muscle Testing:   · Manual Muscle Testing Results	grossly at least 3+/5 2/2 ability to perform functional mob against gravity	    Bed Mobility: Rolling/Turning:     · Level of [place]	contact guard	  · Physical Assist/Nonphysical Assist	1 person assist; nonverbal cues (demo/gestures); verbal cues	  · Assistive Device	bed rails	    Bed Mobility: Scooting/Bridging:     · Level of [place]	minimum assist (75% patients effort)	  · Physical Assist/Nonphysical Assist	1 person assist; nonverbal cues (demo/gestures); verbal cues	  · Assistive Device	bed rails	    Bed Mobility: Sit to Supine:     · Level of [place]	moderate assist (50% patients effort)	  · Physical Assist/Nonphysical Assist	1 person assist; nonverbal cues (demo/gestures); verbal cues	  · Assistive Device	bed rails	    Bed Mobility: Supine to Sit:     · Level of [place]	minimum assist (75% patients effort)	  · Physical Assist/Nonphysical Assist	1 person assist; verbal cues; nonverbal cues (demo/gestures)	  · Assistive Device	bed rails	    Bed Mobility Analysis:     · Bed Mobility Limitations	decreased ability to use arms for pushing/pulling; decreased ability to use legs for bridging/pushing	  · Impairments Contributing to Impaired Bed Mobility	impaired balance; cognition; pain; decreased strength	    Transfer: Sit to Stand:     · Level of [place]	moderate assist (50% patients effort)	  · Physical Assist/Nonphysical Assist	1 person assist; nonverbal cues (demo/gestures); verbal cues	  · Assistive Device	straight cane	    Transfer: Stand to Sit:     · Level of [place]	minimum assist (75% patients effort)	  · Physical Assist/Nonphysical Assist	1 person assist; nonverbal cues (demo/gestures); verbal cues	  · Assistive Device	straight cane	    Sit/Stand Transfer Safety Analysis:     · Transfer Safety Concerns Noted	decreased balance during turns; decreased safety awareness; losing balance; decreased sequencing ability; decreased step length; decreased weight-shifting ability	  · Impairments Contributing to Impaired Transfers	impaired balance; cognition; pain; decreased strength	    Gait Skills:     · Level of [place]	minimum assist (75% patients effort)	  · Physical Assist/Nonphysical Assist	1 person assist; nonverbal cues (demo/gestures); verbal cues	  · Assistive Device	straight cane	  · Gait Distance	25 feet	    Gait Analysis:     · Gait Pattern Used	3-point gait	  · Gait Deviations Noted	decreased flynn; increased time in double stance; decreased step length; decreased weight-shifting ability	  · Impairments Contributing to Gait Deviations	impaired balance; cognition; pain; decreased strength	    Stair Negotiation:     · Level of [place]	NA	    Balance Skills Assessment:     · Sitting Balance: Static	good balance	  · Sitting Balance: Dynamic	fair balance	  · Sit-to-Stand Balance	poor balance	  · Standing Balance: Static	fair balance	  · Standing Balance: Dynamic	poor balance	  · Systems Impairment Contributing to Balance Disturbance	musculoskeletal	  · Identified Impairments Contributing to Balance Disturbance	decreased strength; pain	    Clinical Impressions:   · Criteria for Skilled Therapeutic Interventions	impairments found; functional limitations in following categories; risk reduction/prevention; rehab potential; therapy frequency; anticipated discharge recommendation	  · Impairments Found (describe specific impairments)	aerobic capacity/endurance; cognitive impairment; gait, locomotion, and balance; muscle strength; poor safety awareness	  · Functional Limitations in Following Categories (describe specific limitations)	self-care; home management; community/leisure	  · Risk Reduction/Prevention (Describe Specific Areas of risk reduction/prevention)	risk factors	  · Risk Areas	fall; safety awareness; cognitive impairment	  · Rehab Potential	fair, will monitor progress closely	  · Therapy Frequency	2-3x/week	    PM&R Impression: as above    Disposition Plan Recommendations: subacute rehab placement

## 2017-09-21 NOTE — PROGRESS NOTE ADULT - PROBLEM SELECTOR PLAN 6
pt has been having progressively worsening dementia over last 3 years  - c/w home memantine 20mg qday Pt has been having progressively worsening dementia over last 3 years  - c/w home memantine 20mg qday

## 2017-09-21 NOTE — PROGRESS NOTE ADULT - PROBLEM SELECTOR PLAN 1
unwitnessed; has had cardiac work-up as outpatient, will try to obtain collateral info; no e/o acute fractures on imaging (but possible old rib fractures); f/u results of EEG; cont. PT, fall precautions

## 2017-09-21 NOTE — PROGRESS NOTE ADULT - PROBLEM SELECTOR PLAN 4
Labs notable for Na 121 --> 124 -- > 126 --> 132  Received 500cc bolus in LHGV and 125cc/hr of NS, received 500cc bolus yesterday afternoon  Pt appears less hypovolemic.  - serum osm = 256 , urine osm = 299 , urine Na = 45   - trend BMP q8h to ensure Na is not increasing by more than 8meq/24 hrs Labs notable for Na 121 --> 124 -- > 126 --> 132  Received 500cc bolus in LHGV and 125cc/hr of NS after admission, received 500cc bolus yesterday afternoon  Pt appears less hypovolemic.  - serum osm = 256 , urine osm = 299 , urine Na = 45 (9/20 labs)  - trend BMP q8h to ensure Na is not increasing by more than 8meq/24 hrs Labs notable for Na 121 --> 124 -- > 126 --> 132  Received 500cc bolus in LHGV and 125cc/hr of NS after admission, received 500cc bolus yesterday afternoon. [Na] increased by 7 mEq/L within 24 hrs, will wait for afternoon labs to see if more NS is needed,   Pt appears less hypovolemic.  - serum osm = 256 , urine osm = 299 , urine Na = 45 (9/20 labs)  - trend BMP q8h to ensure Na is not increasing by more than 8meq/24 hrs Labs notable for Na 121 --> 124 -- > 126 --> 132  Received 500cc bolus in LHGV and 125cc/hr of NS after admission, received 500cc bolus yesterday afternoon. [Na] increased by 7 mEq/L within 24 hrs  Pt appears less hypovolemic.  - serum osm = 256 , urine osm = 299 , urine Na = 45 (9/20 labs)

## 2017-09-21 NOTE — PROGRESS NOTE ADULT - PROBLEM SELECTOR PLAN 2
hypo-osmolar, #s improving w/ fluids; encourage oral hydration;; monitor BMP; possibly related to fall?

## 2017-09-21 NOTE — PROGRESS NOTE ADULT - PROBLEM SELECTOR PLAN 2
Musculoskeletal in nature due to tenderness to palpation and exacerbation with movement and inspiration. Pain control, follow up official xray studies: no fractures of rib on R anterior chest Musculoskeletal in nature due to tenderness to palpation and exacerbation with movement and inspiration. Pain control, xrays unremarkable for fractures, no fractures of rib on R anterior chest

## 2017-09-21 NOTE — PROGRESS NOTE ADULT - PROBLEM SELECTOR PLAN 10
DVT PPX: HSQ q8h as we are holding pradaxa. If restarting pradaxa, will dc HSQ  ETHICS: Full Code  Disposition: Pending clinical improvement. [facility] placement. DVT PPX: Pradaxa   ETHICS: Full Code  Disposition: Pending clinical improvement. [facility] placement.

## 2017-09-21 NOTE — PROGRESS NOTE ADULT - SUBJECTIVE AND OBJECTIVE BOX
Patient is a 89y old  Female who presents with a chief complaint of Fall (20 Sep 2017 02:51)      INTERVAL HPI/OVERNIGHT EVENTS:    Pt. seen and examined at 9:10AM  Pt.'s preferred language is Cantonese, but she speaks English  Pt. c/o R-sided rib pain s/p fall; otherwise no complaints  No F/C, CP, SOB, N/V, HA, lightheadedness    Review of Systems: 12 point review of systems otherwise negative    MEDICATIONS  (STANDING):  lisinopril 5 milliGRAM(s) Oral daily  propafenone 225 milliGRAM(s) Oral two times a day  simvastatin 10 milliGRAM(s) Oral at bedtime  memantine 20 milliGRAM(s) Oral daily  oxybutynin 5 milliGRAM(s) Oral daily  insulin lispro (HumaLOG) corrective regimen sliding scale   SubCutaneous Before meals and at bedtime  dextrose 5%. 1000 milliLiter(s) (50 mL/Hr) IV Continuous <Continuous>  dextrose 50% Injectable 12.5 Gram(s) IV Push once  dextrose 50% Injectable 25 Gram(s) IV Push once  dextrose 50% Injectable 25 Gram(s) IV Push once  dabigatran 75 milliGRAM(s) Oral every 12 hours    MEDICATIONS  (PRN):  acetaminophen   Tablet 650 milliGRAM(s) Oral every 6 hours PRN For Temp greater than 38.5 C (101.3 F)  dextrose Gel 1 Dose(s) Oral once PRN Blood Glucose LESS THAN 70 milliGRAM(s)/deciliter  glucagon  Injectable 1 milliGRAM(s) IntraMuscular once PRN Glucose LESS THAN 70 milligrams/deciliter      Allergies    penicillins (Hives)  shellfish (Unknown)    Intolerances          Vital Signs Last 24 Hrs  T(C): 36.3 (21 Sep 2017 10:32), Max: 36.9 (20 Sep 2017 15:53)  T(F): 97.3 (21 Sep 2017 10:32), Max: 98.4 (20 Sep 2017 15:53)  HR: 72 (21 Sep 2017 10:32) (61 - 72)  BP: 157/74 (21 Sep 2017 10:32) (113/60 - 157/74)  BP(mean): --  RR: 18 (21 Sep 2017 10:32) (18 - 18)  SpO2: 97% (21 Sep 2017 10:32) (96% - 97%)  CAPILLARY BLOOD GLUCOSE  217 (21 Sep 2017 11:58)  146 (21 Sep 2017 07:44)  141 (20 Sep 2017 21:51)  157 (20 Sep 2017 16:56)            Physical Exam:  (at 9:10AM)  Daily     Daily   General:  comfortable-appearing sitting up in bed eating breakfast  HEENT:  MMM  CV: no JVD  Lungs:  decreased B.S at bases; normal WOB on RA  Abdomen:  soft NT ND  Extremities:  no edema B/L LE  Skin:  WWP  Neuro:  non-focal    LABS:                        13.3   7.4   )-----------( 197      ( 21 Sep 2017 06:10 )             38.3     09-21    132<L>  |  96  |  10  ----------------------------<  149<H>  4.1   |  23  |  0.52    Ca    9.1      21 Sep 2017 06:07  Phos  2.6     -  Mg     1.7           PT/INR - ( 19 Sep 2017 21:25 )   PT: 13.4 sec;   INR: 1.21          PTT - ( 19 Sep 2017 21:25 )  PTT:51.9 sec  Urinalysis Basic - ( 19 Sep 2017 21:42 )    Color: Yellow / Appearance: Clear / S.020 / pH: x  Gluc: x / Ketone: 15 mg/dL  / Bili: NEGATIVE / Urobili: 0.2 E.U./dL   Blood: x / Protein: 100 mg/dL / Nitrite: NEGATIVE   Leuk Esterase: NEGATIVE / RBC: < 5 /HPF / WBC < 5 /HPF   Sq Epi: x / Non Sq Epi: Few /HPF / Bacteria: Present /HPF          RADIOLOGY & ADDITIONAL TESTS:    ---------------------------------------------------------------------------  I personally reviewed: [  ]EKG   [  ]CXR    [  ] CT    [  ]Other  ---------------------------------------------------------------------------  PLEASE CHECK WHEN PRESENT:     [  ]Heart Failure     [  ] Acute     [  ] Acute on Chronic     [  ] Chronic  -------------------------------------------------------------------     [  ]Diastolic [HFpEF]     [  ]Systolic [HFrEF]     [  ]Combined [HFpEF & HFrEF]     [  ]Other:  -------------------------------------------------------------------  [  ]CASSANDRA     [  ]ATN     [  ]Reneal Medullary Necrosis     [  ]Renal Cortical Necrosis     [  ]Other Pathological Lesions:    [  ]CKD 1  [  ]CKD 2  [  ]CKD 3  [  ]CKD 4  [  ]CKD 5  [  ]Other  -------------------------------------------------------------------  [  ]Other/Unspecified:    --------------------------------------------------------------------    Abdominal Nutritional Status  [  ]Malnutrition: See Nutrition Note  [  ]Cachexia  [  ]Other:   [  ]Supplement Ordered:  [  ]Morbid Obesity (BMI >=40]

## 2017-09-22 NOTE — DISCHARGE NOTE ADULT - PATIENT PORTAL LINK FT
“You can access the Follow[facility] Patient Portal, offered by [facility], by registering with the following website: [email or web address]”

## 2017-09-22 NOTE — PROGRESS NOTE ADULT - SUBJECTIVE AND OBJECTIVE BOX
Patient is a 89y old  Female who presents with a chief complaint of Fall (22 Sep 2017 06:34)      INTERVAL HPI/OVERNIGHT EVENTS:    Pt. seen and examined at 11:45AM  Rib/shoulder pain improved; controlled w/ PRN Tylenol  Tolerating P.O.    Review of Systems: 12 point review of systems otherwise negative    MEDICATIONS  (STANDING):  lisinopril 5 milliGRAM(s) Oral daily  propafenone 225 milliGRAM(s) Oral two times a day  simvastatin 10 milliGRAM(s) Oral at bedtime  memantine 20 milliGRAM(s) Oral daily  oxybutynin 5 milliGRAM(s) Oral daily  insulin lispro (HumaLOG) corrective regimen sliding scale   SubCutaneous Before meals and at bedtime  dextrose 5%. 1000 milliLiter(s) (50 mL/Hr) IV Continuous <Continuous>  dextrose 50% Injectable 12.5 Gram(s) IV Push once  dextrose 50% Injectable 25 Gram(s) IV Push once  dextrose 50% Injectable 25 Gram(s) IV Push once  dabigatran 75 milliGRAM(s) Oral every 12 hours    MEDICATIONS  (PRN):  acetaminophen   Tablet 650 milliGRAM(s) Oral every 6 hours PRN For Temp greater than 38.5 C (101.3 F)  dextrose Gel 1 Dose(s) Oral once PRN Blood Glucose LESS THAN 70 milliGRAM(s)/deciliter  glucagon  Injectable 1 milliGRAM(s) IntraMuscular once PRN Glucose LESS THAN 70 milligrams/deciliter  acetaminophen   Tablet. 650 milliGRAM(s) Oral every 6 hours PRN Moderate Pain (4 - 6)      Allergies    penicillins (Hives)  shellfish (Unknown)    Intolerances    lactose (Unknown)        Vital Signs Last 24 Hrs  T(C): 36.8 (22 Sep 2017 10:23), Max: 36.8 (22 Sep 2017 10:23)  T(F): 98.2 (22 Sep 2017 10:23), Max: 98.2 (22 Sep 2017 10:23)  HR: 59 (22 Sep 2017 10:23) (55 - 73)  BP: 121/50 (22 Sep 2017 10:23) (121/50 - 150/85)  BP(mean): --  RR: 16 (22 Sep 2017 10:23) (16 - 18)  SpO2: 97% (22 Sep 2017 10:23) (97% - 99%)  CAPILLARY BLOOD GLUCOSE  223 (22 Sep 2017 12:00)  184 (22 Sep 2017 07:36)  140 (21 Sep 2017 22:08)  205 (21 Sep 2017 17:06)          09-21 @ 07:01  -  09-22 @ 07:00  --------------------------------------------------------  IN: 50 mL / OUT: 0 mL / NET: 50 mL        Physical Exam:  (at 11:45AM)  Daily     Daily   General:  well-appearing  HEENT:  MMM  CV:  no JVD  Abdomen:  soft NT ND  Extremities:  no edema B/L LE  Skin:  WWP  Neuro:  alert, non-focal    LABS:                        13.3   7.4   )-----------( 197      ( 21 Sep 2017 06:10 )             38.3     09-21    132<L>  |  96  |  10  ----------------------------<  149<H>  4.1   |  23  |  0.52    Ca    9.1      21 Sep 2017 06:07  Phos  2.6     09-21  Mg     1.7     09-21              RADIOLOGY & ADDITIONAL TESTS:    ---------------------------------------------------------------------------  I personally reviewed: [  ]EKG   [  ]CXR    [  ] CT    [  ]Other  ---------------------------------------------------------------------------  PLEASE CHECK WHEN PRESENT:     [  ]Heart Failure     [  ] Acute     [  ] Acute on Chronic     [  ] Chronic  -------------------------------------------------------------------     [  ]Diastolic [HFpEF]     [  ]Systolic [HFrEF]     [  ]Combined [HFpEF & HFrEF]     [  ]Other:  -------------------------------------------------------------------  [  ]CASSANDRA     [  ]ATN     [  ]Reneal Medullary Necrosis     [  ]Renal Cortical Necrosis     [  ]Other Pathological Lesions:    [  ]CKD 1  [  ]CKD 2  [  ]CKD 3  [  ]CKD 4  [  ]CKD 5  [  ]Other  -------------------------------------------------------------------  [  ]Other/Unspecified:    --------------------------------------------------------------------    Abdominal Nutritional Status  [  ]Malnutrition: See Nutrition Note  [  ]Cachexia  [  ]Other:   [  ]Supplement Ordered:  [  ]Morbid Obesity (BMI >=40]

## 2017-09-22 NOTE — DISCHARGE NOTE ADULT - MEDICATION SUMMARY - MEDICATIONS TO TAKE
I will START or STAY ON the medications listed below when I get home from the hospital:    lisinopril 5 mg oral tablet  -- 1 tab(s) by mouth once a day  -- Indication: For Essential hypertension    propafenone 225 mg oral tablet  -- 1 tab(s) by mouth 2 times a day  -- Indication: For Afib    Pradaxa 75 mg oral capsule  -- 1 cap(s) by mouth 2 times a day  -- Indication: For Afib    metFORMIN 500 mg oral tablet  -- 1 tab(s) by mouth 2 times a day  -- Indication: For Diabetes mellitus    glipiZIDE 5 mg oral tablet  -- 1 tab(s) by mouth once a day  -- Indication: For Diabetes mellitus    Januvia 50 mg oral tablet  -- 1 tab(s) by mouth once a day  -- Indication: For Diabetes mellitus    simvastatin 10 mg oral tablet  -- 1 tab(s) by mouth once a day (at bedtime)  -- Indication: For Prophylactic measure    Namenda  -- 21 milligram(s) by mouth once a day  -- Indication: For Dementia    VESIcare 5 mg oral tablet  -- 1 tab(s) by mouth once a day  -- Indication: For Prophylactic measure

## 2017-09-22 NOTE — DISCHARGE NOTE ADULT - SECONDARY DIAGNOSIS.
Hyponatremia Hypertension Afib Dementia Type 2 diabetes mellitus without complication, without long-term current use of insulin

## 2017-09-22 NOTE — DISCHARGE NOTE ADULT - HOSPITAL COURSE
An 89yo Cantonese speaking F w/ PMH of MILANA.fib (on propafenone for rate control and Pradaxa for AC), urinary incontinence, DM (on oral hypoglycemics), dementia (dx'ed in 2014) presents for unwitnessed fall at home (had 6 falls in the past year). Brother found her down on the bathroom floor, alert, but unable to explain what happened. Patient did not have fecal incontinence or tongue biting, but did have urinary incontinence. Ouaptient cardiac  She has fallen on her head before and has had multiple scans, all of which were negative per the daughter. She also has had some outpatient work-up by her Cardiologist, all of which were negative. Pt also had a discussion with her cardiologist regarding pradaxa, and her pradaxa dose was lowered, but not discontinued. Pt has no h/o seizure d/o. Pt has been progressively declining mentally over the last 3 years, becoming more forgetful and suffering from short-term memory loss. She lives alone in an elevator building and has a HHA for 4 hours M-F. Pt ambulates with a cane and/or shopping cart. She walks alone everyday around 5pm. She also is able to bathe and dress herself. She has never been to a nursing home/[facility]. Currently, pt's only complaint is of her right shoulder being in mild pain. Otherwise, she denies headache, fever, chills, nausea, vomiting.     In [facility] ED, T 97.6F, HR 60-70s, -160/70s, RR 17, 97 RA. CT head was neg. CT cervical spine neg for acute fx. Labs notable for Na 121. Given 500cc bolus NS and NS @ 125cc/hr. An 88yo Cantonese speaking F w/ PMH of MILANA.fib (on propafenone for rate control and Pradaxa for AC), urinary incontinence, DM (on oral hypoglycemics), dementia (dx'ed in 2014) presents for unwitnessed fall at home (had 6 falls in the past year). Patient found to have had an unwitnessed fall at home, found down on bathroom floor, alert but unable to explain what happened, approx the 7th time this past year. History of worsening gait and dementia x 3 years. Uses cane or shopping cart to ambulate. Lives alone in elevator building. Patient did not have fecal incontinence or tongue biting, but did have urinary incontinence. Outpatient cardiac workup normal.  Prior scans negative as per daughter. Patient also had a discussion with her cardiologist regarding pradaxa, dose was lowered. Pt has no h/o seizure. HHA for 4 hours M-F. CT head negative for acute bleed or hemorrhage. Xrays of the spine and shoulder were unremarkable. No fracture present. Pain controlled with Tramadol and Tylenol. Musculoskeletal pain in anterior chest wall and R shoulder  due to tenderness to palpation and exacerbation with movement and inspiration. Patient to be discharged to subacute rehab for strength training and improvement.     Patient found to be hyponatremic and hypovolemic on admission likely secondary to dehydration. Labs notable for Na 121 --> 124 -- > 126 --> 132. Received 500cc bolus in LHGV and 125cc/hr of NS after admission, received 500cc on the floors. [Na] increased by 7 mEq/L within 24 hrs. Patient appeared less hypovolemic. With fluid resuscitation, patient sodium improved.     Patient hemodynamically stable and ready for discharge to [facility]. An 88yo Cantonese speaking F w/ PMH of MILANA.fib (on propafenone for rate control and Pradaxa for AC), urinary incontinence, DM (on oral hypoglycemics), dementia (dx'ed in 2014) presents for unwitnessed fall at home (had 6 falls in the past year). Patient found to have had an unwitnessed fall at home, found down on bathroom floor, alert but unable to explain what happened, approx the 7th time this past year. History of worsening gait and dementia x 3 years. Uses cane or shopping cart to ambulate. Lives alone in elevator building. Patient did not have fecal incontinence or tongue biting, but did have urinary incontinence. Outpatient cardiac workup normal.  Prior scans negative as per daughter. Patient also had a discussion with her cardiologist regarding pradaxa, dose was lowered. Pt has no h/o seizure. HHA for 4 hours M-F. CT head negative for acute bleed or hemorrhage. Xrays of the spine and shoulder were unremarkable. No fracture present. Pain controlled with Tramadol and Tylenol. Musculoskeletal pain in anterior chest wall and R shoulder due to tenderness to palpation and exacerbation with movement and inspiration.     Patient found to be hyponatremic and hypovolemic on admission likely secondary to dehydration. Labs notable for Na 121 --> 124 -- > 126 --> 132. Received 500cc bolus in LHGV and 125cc/hr of NS after admission, received 500cc on the floors. [Na] increased by 7 mEq/L within 24 hrs. Patient appeared less hypovolemic. With fluid resuscitation, patient sodium improved.     Patient hemodynamically stable and ready for discharge to [facility].

## 2017-09-22 NOTE — PROGRESS NOTE ADULT - PROBLEM SELECTOR PLAN 1
unwitnessed; has had cardiac work-up as outpatient, will try to obtain collateral info; no e/o acute fractures on imaging (but possible old rib fractures); f/u results of EEG; cont. fall precautions, PT

## 2017-09-22 NOTE — DISCHARGE NOTE ADULT - PLAN OF CARE
Improve Function Patient found to have had an unwitnessed fall at home, found down on bathroom floor, alert but unable to explain what happened, approx the 7th time this past year. History of worsening gait. Uses cane or shopping cart to ambulate. Lives alone in elevator building. CT head negative for acute bleed or hemorrhage. Xrays of the spine and shoulder were unremarkable. No fracture present. Pain controlled with Tramadol and Tylenol. Musculoskeletal pain in nature due to tenderness to palpation and exacerbation with movement and inspiration. Patient to be discharged to subacute rehab for strength training and improvement. Increase Sodium Patient found to be hyponatremic and hypovolemic on admission likely secondary to dehydration. Labs notable for Na 121 --> 124 -- > 126 --> 132. Received 500cc bolus in LHGV and 125cc/hr of NS after admission, received 500cc on the floors. [Na] increased by 7 mEq/L within 24 hrs. Patient appeared less hypovolemic. With fluid resuscitation, patient sodium improved. Continue to drink fluids and stay hydrated. Decrease BP BP stable during admission. Continue with home lisinopril 5mg daily. Rate and Rhythm Control Patient with history of afib on Propafenone and Pradaxa. Found to be in regular rate and rhythm on admission. We continued propafenone but held Pradaxa until further collateral received from outpatient cardiologist. Pradaxa was restarted. Continue home medications for Afib and please follow up with your outpatient cardiologist regularly for further management. Improve function Patient has been having progressively worsening dementia over last 3 years leading to falls. Please   continue with home Memantine 20mg daily. Control blood sugar Patient with Type 2 diabetes mellitus without complication, without long-term current use of insulin.  During hospital stay we held home medications - glipizide, metformin, Januvia. Patient placed on insulin sliding scale for adequate coverage. hgb A1c = 6.8. Please restart home medication at home. Follow up with PCP for better titration of home medications. Patient found to have had an unwitnessed fall at home, found down on bathroom floor, alert but unable to explain what happened, approx the 7th time this past year. History of worsening gait. Uses cane or shopping cart to ambulate. Lives alone in elevator building. CT head negative for acute bleed or hemorrhage. Xrays of the spine and shoulder were unremarkable. No fracture present. Pain controlled with Tramadol and Tylenol. Musculoskeletal pain in nature due to tenderness to palpation and exacerbation with movement and inspiration. Patient to be discharged to subacute rehab for strength training and improvement. Can take Tylenol 325mg for pain as needed.

## 2017-09-22 NOTE — PROGRESS NOTE ADULT - PROBLEM SELECTOR PLAN 2
hypo-osmolar, #s improved w/ fluids; encourage oral hydration;; monitor BMP; possibly related to fall?

## 2017-09-22 NOTE — DISCHARGE NOTE ADULT - CARE PLAN
Principal Discharge DX:	Recurrent falls  Goal:	Improve Function  Instructions for follow-up, activity and diet:	Patient found to have had an unwitnessed fall at home, found down on bathroom floor, alert but unable to explain what happened, approx the 7th time this past year. History of worsening gait. Uses cane or shopping cart to ambulate. Lives alone in elevator building. CT head negative for acute bleed or hemorrhage. Xrays of the spine and shoulder were unremarkable. No fracture present. Pain controlled with Tramadol and Tylenol. Musculoskeletal pain in nature due to tenderness to palpation and exacerbation with movement and inspiration. Patient to be discharged to subacute rehab for strength training and improvement.  Secondary Diagnosis:	Hyponatremia  Goal:	Increase Sodium  Instructions for follow-up, activity and diet:	Patient found to be hyponatremic and hypovolemic on admission likely secondary to dehydration. Labs notable for Na 121 --> 124 -- > 126 --> 132. Received 500cc bolus in LHGV and 125cc/hr of NS after admission, received 500cc on the floors. [Na] increased by 7 mEq/L within 24 hrs. Patient appeared less hypovolemic. With fluid resuscitation, patient sodium improved. Continue to drink fluids and stay hydrated.  Secondary Diagnosis:	Hypertension  Goal:	Decrease BP  Instructions for follow-up, activity and diet:	BP stable during admission. Continue with home lisinopril 5mg daily.  Secondary Diagnosis:	Afib  Goal:	Rate and Rhythm Control  Instructions for follow-up, activity and diet:	Patient with history of afib on Propafenone and Pradaxa. Found to be in regular rate and rhythm on admission. We continued propafenone but held Pradaxa until further collateral received from outpatient cardiologist. Pradaxa was restarted. Continue home medications for Afib and please follow up with your outpatient cardiologist regularly for further management.  Secondary Diagnosis:	Dementia  Goal:	Improve function  Instructions for follow-up, activity and diet:	Patient has been having progressively worsening dementia over last 3 years leading to falls. Please   continue with home Memantine 20mg daily.  Secondary Diagnosis:	Type 2 diabetes mellitus without complication, without long-term current use of insulin  Goal:	Control blood sugar  Instructions for follow-up, activity and diet:	Patient with Type 2 diabetes mellitus without complication, without long-term current use of insulin.  During hospital stay we held home medications - glipizide, metformin, Januvia. Patient placed on insulin sliding scale for adequate coverage. hgb A1c = 6.8. Please restart home medication at home. Follow up with PCP for better titration of home medications. Principal Discharge DX:	Recurrent falls  Goal:	Improve Function  Instructions for follow-up, activity and diet:	Patient found to have had an unwitnessed fall at home, found down on bathroom floor, alert but unable to explain what happened, approx the 7th time this past year. History of worsening gait. Uses cane or shopping cart to ambulate. Lives alone in elevator building. CT head negative for acute bleed or hemorrhage. Xrays of the spine and shoulder were unremarkable. No fracture present. Pain controlled with Tramadol and Tylenol. Musculoskeletal pain in nature due to tenderness to palpation and exacerbation with movement and inspiration. Patient to be discharged to subacute rehab for strength training and improvement. Can take Tylenol 325mg for pain as needed.  Secondary Diagnosis:	Hyponatremia  Goal:	Increase Sodium  Instructions for follow-up, activity and diet:	Patient found to be hyponatremic and hypovolemic on admission likely secondary to dehydration. Labs notable for Na 121 --> 124 -- > 126 --> 132. Received 500cc bolus in LHGV and 125cc/hr of NS after admission, received 500cc on the floors. [Na] increased by 7 mEq/L within 24 hrs. Patient appeared less hypovolemic. With fluid resuscitation, patient sodium improved. Continue to drink fluids and stay hydrated.  Secondary Diagnosis:	Hypertension  Goal:	Decrease BP  Instructions for follow-up, activity and diet:	BP stable during admission. Continue with home lisinopril 5mg daily.  Secondary Diagnosis:	Afib  Goal:	Rate and Rhythm Control  Instructions for follow-up, activity and diet:	Patient with history of afib on Propafenone and Pradaxa. Found to be in regular rate and rhythm on admission. We continued propafenone but held Pradaxa until further collateral received from outpatient cardiologist. Pradaxa was restarted. Continue home medications for Afib and please follow up with your outpatient cardiologist regularly for further management.  Secondary Diagnosis:	Dementia  Goal:	Improve function  Instructions for follow-up, activity and diet:	Patient has been having progressively worsening dementia over last 3 years leading to falls. Please   continue with home Memantine 20mg daily.  Secondary Diagnosis:	Type 2 diabetes mellitus without complication, without long-term current use of insulin  Goal:	Control blood sugar  Instructions for follow-up, activity and diet:	Patient with Type 2 diabetes mellitus without complication, without long-term current use of insulin.  During hospital stay we held home medications - glipizide, metformin, Januvia. Patient placed on insulin sliding scale for adequate coverage. hgb A1c = 6.8. Please restart home medication at home. Follow up with PCP for better titration of home medications.

## 2017-10-21 PROBLEM — I48.91 UNSPECIFIED ATRIAL FIBRILLATION: Chronic | Status: ACTIVE | Noted: 2017-01-01

## 2017-10-21 PROBLEM — I10 ESSENTIAL (PRIMARY) HYPERTENSION: Chronic | Status: ACTIVE | Noted: 2017-01-01

## 2017-10-21 PROBLEM — E11.9 TYPE 2 DIABETES MELLITUS WITHOUT COMPLICATIONS: Chronic | Status: ACTIVE | Noted: 2017-01-01

## 2017-10-21 NOTE — ED ADULT NURSE REASSESSMENT NOTE - NS ED NURSE REASSESS COMMENT FT1
Patient already prepared for the morgue.  Family at bedside.  Waiting for more family for bereavement time.  Belongings sent home with family.  Comfort measures provided.

## 2017-10-21 NOTE — ED PROVIDER NOTE - OBJECTIVE STATEMENT
89F hx paroxysmal afib, dm, htn, dementia, BIBEMS as cardiac arrest.  pt was at NH, recently diagnosed with L pleural effusion on ABX.  per NH pt with respiratory distress.  upon ems arrival pt hypoxic <50%, no breath sounds on L.  pt intubated at NH with improvement in breath sounds.  pt then went into cardiac arrest. ACLS started.  given few rounds epi intermittent return of circulation.  upon ED arrival pt again lost pulses.  L IO placed. given epi, calcium, bicarb, CPR continued.  no return of spontaneous circulation.  TOD 0444.

## 2017-10-21 NOTE — ED ADULT NURSE REASSESSMENT NOTE - NS ED NURSE REASSESS COMMENT FT1
rhythm check, asystole, BP non-determinable, no pulse, bedside cardiac US is done, no movement hence Tj SPARKS declared the pt dead.

## 2017-10-21 NOTE — ED ADULT NURSE REASSESSMENT NOTE - NS ED NURSE REASSESS COMMENT FT1
pulse check, no rhythm, no signs of life, resumed ACLS. pulse check, no rhythm, no signs of life, resumed chest compression.

## 2017-10-21 NOTE — ED PROVIDER NOTE - MEDICAL DECISION MAKING DETAILS
cardiac arrest, no return of spontaneous circulation.  pronounced at 0444  NOK Daughter Aissatou Salter notified

## 2017-10-21 NOTE — ED ADULT NURSE NOTE - OBJECTIVE STATEMENT
pt is IVAN from [facility] (with prior notification) with case of Cardiac Arrest pt is IVAN from [facility] (with prior notification) with case of Cardiac Arrest  O2 sat is <50%, intubated by the EMS in NH with bilat air entry.  prior to the arrival in the ED, pt lost VS again, ACLS initiated, inserted IO line on LL leg with IV medications given as ordered.  with all the measures given but to no avail declared dead by Tj SPARKS at 0444H. pt is IVAN from [facility] (with prior notification from EMS) with case of Cardiac Arrest  O2 sat is <50%, intubated by the EMS in NH with bilat air entry.  prior to the arrival in the ED, pt lost VS again, ACLS initiated, inserted IO line on LL leg with IV medications given as ordered.  with all the measures given but to no avail declared dead by Tj SPARKS at 0444H.

## 2017-10-21 NOTE — ED ADULT NURSE REASSESSMENT NOTE - NS ED NURSE REASSESS COMMENT FT1
Patient now ready for transfer to [facility] after arrival of last family member.  Charge nurse and ANM already aware.

## 2020-07-01 NOTE — ED PROVIDER NOTE - HISTORY ATTESTATION, MLM
I have reviewed and confirmed nurses' notes...
<-- Click to add NO significant Past Surgical History

## 2024-07-11 NOTE — ED ADULT NURSE NOTE - NSSISCREENINGQ4_ED_A_ED
65 y/o female with obesity (BMI 34), HTN, CVA (R-PCA), s/p TNK at [facility] and thrombectomy ([facility] 7/2023), ADORE, cerebral aneurysm s/p embolization (2/2024), and persistent AF (on Eliquis) s/p multiple MIRANDA/DCCV (most recently 5/29/24), who presented 7/10/24 for elective radiofrequency ablation. Procedure went well and without immediate complications but noted to have developed ZIGGY later on for which EP service requested admission for. 65 y/o female with obesity (BMI 34), HTN, CVA (R-PCA), s/p TNK at [facility] and thrombectomy ([facility] 7/2023), ADORE, cerebral aneurysm s/p embolization (2/2024), and persistent AF (on Eliquis) s/p multiple MIRANDA/DCCV (most recently 5/29/24), who presented 7/10/24 for elective radiofrequency ablation. Procedure went well and without immediate complications but noted to have developed ZIGGY later on for which EP service requested admission for.    Patient seen at bedside. Reports feeling well but endorses decreased urine output since last night. Lasix 20mg IVP given once last night at 10PM and again today at 5AM. Cr noted to be 1.11 this morning (baseline 0.7-0.8) which trended up to 1.3 then 1.6 in the afternoon. No 65 y/o female with obesity (BMI 34), HTN, CVA (R-PCA), s/p TNK at [facility] and thrombectomy ([facility] 7/2023), ADORE, cerebral aneurysm s/p embolization (2/2024), and persistent AF (on Eliquis) s/p multiple MIRANDA/DCCV (most recently 5/29/24), who presented 7/10/24 for elective radiofrequency ablation. Procedure went well and without immediate complications but noted to have developed ZIGGY later on for which EP service requested admission for.    Patient seen at bedside. Reports feeling well but endorses decreased urine output since last night. Lasix 20mg IVP given once last night at 10PM and again today at 5AM. Cr noted to be 1.11 this morning (baseline 0.7-0.8) which trended up to 1.3 then 1.6 in the afternoon despite 1L LR.